# Patient Record
Sex: MALE | Race: WHITE | NOT HISPANIC OR LATINO | Employment: OTHER | URBAN - METROPOLITAN AREA
[De-identification: names, ages, dates, MRNs, and addresses within clinical notes are randomized per-mention and may not be internally consistent; named-entity substitution may affect disease eponyms.]

---

## 2017-01-04 ENCOUNTER — TELEPHONE (OUTPATIENT)
Dept: PAIN MEDICINE | Facility: CLINIC | Age: 67
End: 2017-01-04

## 2017-01-04 RX ORDER — ETODOLAC 400 MG/1
400 TABLET, FILM COATED ORAL 2 TIMES DAILY
Qty: 60 TABLET | Refills: 2 | Status: SHIPPED | OUTPATIENT
Start: 2017-01-04 | End: 2017-07-07 | Stop reason: SDUPTHER

## 2017-01-04 NOTE — TELEPHONE ENCOUNTER
Pt stated he is not getting relief from procedure. He was offered a earlier appt. Will be seen on Friday.

## 2017-01-04 NOTE — TELEPHONE ENCOUNTER
----- Message from Alexa Miranda sent at 1/4/2017  1:22 PM CST -----  Contact: self  Patient [phone] received an injection in his back about two weeks ago and is calling to let Dr Pearce know how he is doing/please call

## 2017-01-06 ENCOUNTER — OFFICE VISIT (OUTPATIENT)
Dept: PAIN MEDICINE | Facility: CLINIC | Age: 67
End: 2017-01-06
Payer: COMMERCIAL

## 2017-01-06 VITALS
HEART RATE: 87 BPM | SYSTOLIC BLOOD PRESSURE: 123 MMHG | HEIGHT: 72 IN | BODY MASS INDEX: 28.04 KG/M2 | DIASTOLIC BLOOD PRESSURE: 88 MMHG | WEIGHT: 207 LBS

## 2017-01-06 DIAGNOSIS — M96.1 POSTLAMINECTOMY SYNDROME OF LUMBAR REGION: ICD-10-CM

## 2017-01-06 DIAGNOSIS — M46.1 SACROILIITIS: ICD-10-CM

## 2017-01-06 DIAGNOSIS — M47.812 SPONDYLOSIS OF CERVICAL REGION WITHOUT MYELOPATHY OR RADICULOPATHY: ICD-10-CM

## 2017-01-06 DIAGNOSIS — M54.16 BILATERAL LUMBAR RADICULOPATHY: Primary | ICD-10-CM

## 2017-01-06 PROCEDURE — 3079F DIAST BP 80-89 MM HG: CPT | Mod: S$GLB,,, | Performed by: PHYSICIAN ASSISTANT

## 2017-01-06 PROCEDURE — 1159F MED LIST DOCD IN RCRD: CPT | Mod: S$GLB,,, | Performed by: PHYSICIAN ASSISTANT

## 2017-01-06 PROCEDURE — 99999 PR PBB SHADOW E&M-EST. PATIENT-LVL III: CPT | Mod: PBBFAC,,, | Performed by: PHYSICIAN ASSISTANT

## 2017-01-06 PROCEDURE — 99213 OFFICE O/P EST LOW 20 MIN: CPT | Mod: S$GLB,,, | Performed by: PHYSICIAN ASSISTANT

## 2017-01-06 PROCEDURE — 3074F SYST BP LT 130 MM HG: CPT | Mod: S$GLB,,, | Performed by: PHYSICIAN ASSISTANT

## 2017-01-19 ENCOUNTER — TELEPHONE (OUTPATIENT)
Dept: SPINE | Facility: CLINIC | Age: 67
End: 2017-01-19

## 2017-01-19 DIAGNOSIS — M54.50 LUMBAR SPINE PAIN: Primary | ICD-10-CM

## 2017-02-07 ENCOUNTER — HOSPITAL ENCOUNTER (OUTPATIENT)
Dept: VASCULAR SURGERY | Facility: CLINIC | Age: 67
Discharge: HOME | End: 2017-02-07
Attending: ORTHOPAEDIC SURGERY
Payer: COMMERCIAL

## 2017-02-07 ENCOUNTER — INITIAL CONSULT (OUTPATIENT)
Dept: ORTHOPEDICS | Facility: CLINIC | Age: 67
End: 2017-02-07
Payer: COMMERCIAL

## 2017-02-07 ENCOUNTER — HOSPITAL ENCOUNTER (OUTPATIENT)
Dept: RADIOLOGY | Facility: HOSPITAL | Age: 67
Discharge: HOME | End: 2017-02-07
Attending: ORTHOPAEDIC SURGERY
Payer: COMMERCIAL

## 2017-02-07 ENCOUNTER — TELEPHONE (OUTPATIENT)
Dept: ORTHOPEDICS | Facility: CLINIC | Age: 67
End: 2017-02-07

## 2017-02-07 VITALS
HEIGHT: 72 IN | HEART RATE: 89 BPM | SYSTOLIC BLOOD PRESSURE: 132 MMHG | BODY MASS INDEX: 33.51 KG/M2 | RESPIRATION RATE: 18 BRPM | WEIGHT: 247.38 LBS | DIASTOLIC BLOOD PRESSURE: 90 MMHG

## 2017-02-07 DIAGNOSIS — M54.50 LUMBAR SPINE PAIN: ICD-10-CM

## 2017-02-07 DIAGNOSIS — I73.9 CLAUDICATION OF BOTH LOWER EXTREMITIES: ICD-10-CM

## 2017-02-07 DIAGNOSIS — Z98.890 HISTORY OF LUMBAR DISCECTOMY: Primary | ICD-10-CM

## 2017-02-07 PROCEDURE — 99999 PR PBB SHADOW E&M-EST. PATIENT-LVL IV: CPT | Mod: PBBFAC,,, | Performed by: ORTHOPAEDIC SURGERY

## 2017-02-07 PROCEDURE — 3075F SYST BP GE 130 - 139MM HG: CPT | Mod: S$GLB,,, | Performed by: ORTHOPAEDIC SURGERY

## 2017-02-07 PROCEDURE — 72120 X-RAY BEND ONLY L-S SPINE: CPT | Mod: 26,,, | Performed by: RADIOLOGY

## 2017-02-07 PROCEDURE — 93923 UPR/LXTR ART STDY 3+ LVLS: CPT | Mod: S$GLB,,, | Performed by: SURGERY

## 2017-02-07 PROCEDURE — 72100 X-RAY EXAM L-S SPINE 2/3 VWS: CPT | Mod: TC

## 2017-02-07 PROCEDURE — 72100 X-RAY EXAM L-S SPINE 2/3 VWS: CPT | Mod: 26,,, | Performed by: RADIOLOGY

## 2017-02-07 PROCEDURE — 3080F DIAST BP >= 90 MM HG: CPT | Mod: S$GLB,,, | Performed by: ORTHOPAEDIC SURGERY

## 2017-02-07 PROCEDURE — 99214 OFFICE O/P EST MOD 30 MIN: CPT | Mod: S$GLB,,, | Performed by: ORTHOPAEDIC SURGERY

## 2017-02-07 RX ORDER — MUPIROCIN 20 MG/G
OINTMENT TOPICAL
COMMUNITY
Start: 2017-01-09

## 2017-02-07 RX ORDER — TRAMADOL HYDROCHLORIDE 50 MG/1
TABLET ORAL
COMMUNITY
Start: 2017-01-09 | End: 2017-02-07

## 2017-02-07 RX ORDER — DIPHENHYDRAMINE HCL 25 MG
25 CAPSULE ORAL EVERY 6 HOURS PRN
COMMUNITY

## 2017-02-07 RX ORDER — IBUPROFEN 200 MG
200 TABLET ORAL EVERY 6 HOURS PRN
COMMUNITY

## 2017-02-07 NOTE — PROGRESS NOTES
DATE: 2/7/2017  PATIENT: Jorge Gipson    Attending Physician: Brandon Brasher M.D.    CHIEF COMPLAINT: back pain    HISTORY:  Jorge Gipson is a 67 y.o. 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 Alanis 10 years ago, here for initial evaluation of low back and right>left leg pain (Back - 8, Leg - 5). The pain has been present for 1 year without inciting event. The patient describes the pain as burning, achy pain that radiates to bilat feet.  The pain is worse with walking > 1hour and improved by rest and sitting down. There is occasional associated numbness and tingling. There is no subjective weakness. Prior treatments have included ESIs, NSAIDs but no surgeries or formal PT.    The Patient denies myelopathic symptoms such as handwriting changes or difficulty with buttons/coins/keys. Denies perineal paresthesias, bowel/bladder dysfunction.    PAST MEDICAL/SURGICAL HISTORY:  Past Medical History   Diagnosis Date    ALLERGIC RHINITIS     Allergy     Anticoagulant long-term use      ASA 81mg, Naprosyn    Anxiety     Arthritis     Asthma      3/3/2014-no problem in years    BPH (benign prostatic hypertrophy)     Chronic upper back pain     Diverticulum     DJD (degenerative joint disease), lumbar     Fever blister     GERD (gastroesophageal reflux disease)     HEARING LOSS      wears hearing aids    Hyperlipidemia     IBS (irritable bowel syndrome)     Otitis media     Rash     Shoulder pain, bilateral     Sleep apnea      no C-pap     Past Surgical History   Procedure Laterality Date    Tonsillectomy      Adenoidectomy      Tympanostomy tube placement      Mastoids       Billateral    Aural atresia repair      Lumbar spine surgery  2000     For stenosis    Rotator cuff repair       bilaterally, Right x1, left X2    Colonoscopy      Steroid injection to both shoulders      Epidural steroid injection       Pain management    Nerve block        Pain management    Radiofrequency ablation       of cervical nerve on left    Cervical nerve block       Paion Management       Current Medications:   Current Outpatient Prescriptions:     albuterol (PROAIR HFA) 90 mcg/actuation inhaler, Inhale 2 puffs into the lungs every 4 (four) hours as needed for Wheezing., Disp: 18 g, Rfl: 6    ciprofloxacin HCl (CILOXAN) 0.3 % ophthalmic solution, 5 drops to left EAR twice daily X 10 days, Disp: 10 mL, Rfl: 0    dicyclomine (BENTYL) 10 MG capsule, TAKE ONE CAPSULE BY MOUTH AT BEDTIME AS NEEDED, Disp: 90 capsule, Rfl: 0    diphenhydrAMINE (BENADRYL) 25 mg capsule, Take 25 mg by mouth every 6 (six) hours as needed for Itching., Disp: , Rfl:     etodolac (LODINE) 400 MG tablet, Take 1 tablet (400 mg total) by mouth 2 (two) times daily., Disp: 60 tablet, Rfl: 2    fluoxetine (PROZAC) 20 MG capsule, Take 1 capsule (20 mg total) by mouth once daily., Disp: 90 capsule, Rfl: 3    fluticasone (FLONASE) 50 mcg/actuation nasal spray, 2 sprays by Each Nare route once daily. At first sign of sore throat, increase to twice daily for a few days. Once improved, return to maintenance dose of once daily, Disp: 48 g, Rfl: 3    gabapentin (NEURONTIN) 100 MG capsule, Take 2 capsules (200 mg total) by mouth 3 (three) times daily., Disp: 180 capsule, Rfl: 2    hydrocortisone (ANUSOL-HC) 25 mg suppository, Place 1 suppository (25 mg total) rectally nightly. (Patient taking differently: Place 25 mg rectally 2 (two) times daily as needed. ), Disp: 12 suppository, Rfl: 10    hydrocortisone 2.5 % cream, Thin layer to AA face BID for 2 weeks or less as needed for flare, Disp: 28 g, Rfl: 2    ibuprofen (ADVIL,MOTRIN) 200 MG tablet, Take 200 mg by mouth every 6 (six) hours as needed for Pain., Disp: , Rfl:     ketoconazole (NIZORAL) 2 % shampoo, Wash hair with medicated shampoo at least 2x/week - let sit on scalp at least 5 minutes prior to rinsing, Disp: 120 mL, Rfl: 5    loratadine  (CLARITIN) 10 mg tablet, Take 10 mg by mouth once daily., Disp: , Rfl:     MULTIVITAMIN W-MINERALS/LUTEIN (MULTIVITAMIN-MINERALS-LUTEIN ORAL), Take 1 tablet by mouth once daily., Disp: , Rfl:     mupirocin (BACTROBAN) 2 % ointment, , Disp: , Rfl:     omeprazole (PRILOSEC) 40 MG capsule, Take 1 capsule (40 mg total) by mouth once daily., Disp: 90 capsule, Rfl: 3    rosuvastatin (CRESTOR) 10 MG tablet, Take 1 tablet (10 mg total) by mouth once daily., Disp: 90 tablet, Rfl: 3    tizanidine (ZANAFLEX) 4 MG tablet, TAKE ONE TABLET BY MOUTH ONCE DAILY AFTER  DINNER (Patient taking differently: TAKE ONE TABLET BY MOUTH ONCE DAILY AFTER  DINNER/ three times daily as needed), Disp: 30 tablet, Rfl: 0    VIT A/VIT C/VIT E/ZINC/COPPER (PRESERVISION AREDS ORAL), Take 1 tablet by mouth once daily. , Disp: , Rfl:     Social History:   Social History     Social History    Marital status:      Spouse name: N/A    Number of children: N/A    Years of education: N/A     Occupational History    [employer]      Social History Main Topics    Smoking status: Never Smoker    Smokeless tobacco: Former User     Types: Snuff, Chew     Quit date: 3/3/2006      Comment: Chewed tobacco for 30 years    Alcohol use 1.8 oz/week     3 Shots of liquor per week      Comment: socially     Drug use: No    Sexual activity: No     Other Topics Concern    Not on file     Social History Narrative       REVIEW OF SYSTEMS:  Constitution: Negative. Negative for chills, fever and night sweats.   Cardiovascular: Negative for chest pain and syncope.   Respiratory: Negative for cough and shortness of breath.   Gastrointestinal: See HPI. Negative for nausea/vomiting. Negative for abdominal pain.  Genitourinary: See HPI. Negative for discoloration or dysuria.  Hematologic/Lymphatic: neg for bleeding/clotting disorders.   Musculoskeletal: Negative for falls and muscle weakness.   Neurological: See HPI. neg history of seizures. neg history of  cranial surgery or shunts.  Neurological: See HPI. No seizures.   Endocrine: Negative for polydipsia, polyphagia and polyuria.   Allergic/Immunologic: Negative for hives and persistent infections.     EXAM:  Visit Vitals    BP (!) 132/90 (BP Location: Right arm, Patient Position: Sitting, BP Method: Automatic)    Pulse 89    Resp 18    Ht 6' (1.829 m)    Wt 112.2 kg (247 lb 5.7 oz)    BMI 33.55 kg/m2       PHYSICAL EXAMINATION:    General: The patient is a very pleasant 67 y.o. male in no apparent distress, the patient is orientatied to person, place and time.  Psych: Normal mood and affect  HEENT: Vision grossly intact, hearing intact to the spoken word.  Lungs: Respirations unlabored.  Gait: antalgic gait favoring right lower extremity.  Skin: Dorsal lumbar skin negative for rashes, lesions, hairy patches. Well healed midline lumbar incision. There is + lumbar tenderness to palpation.  Left paraspinal tenderness  Range of motion: Lumbar range of motion is acceptable.  Spinal Balance: Global saggital and coronal spinal balance acceptable, no significant for scoliosis and kyphosis.  Musculoskeletal: No pain with the range of motion of the bilateral hips. No trochanteric tenderness to palpation.  Vascular: Bilateral lower extremities warm and well perfused, Dorsalis pedis pulses 2+ bilaterally.  Neurological: Normal strength and tone in all major motor groups in the bilateral lower extremities. Normal sensation to light touch in the L2-S1 dermatomes bilaterally.  Deep tendon reflexes symmetric decreased in the bilateral lower extremities.  Negative Babinski bilaterally. Straight leg raise positive bilaterally.    IMAGING:      Today I personally reviewed AP, Lat and Flex/Ex  upright L-spine that demonstrate no spondylolisthesis.  Diffuse lumbar spondylosis     ASSESSMENT/PLAN:    Claudication - ABIs.  PT for back pain    Jorge was seen today for back pain.    Diagnoses and all orders for this visit:    History  of lumbar discectomy  -     Ambulatory Referral to Physical/Occupational Therapy    Claudication of both lower extremities  -     US Ankle Brachial Indices Ext LTD WO Str; Future  -     Vascular Lab ([facility]) Ankle Brachial Indices Resting; Future

## 2017-02-07 NOTE — MR AVS SNAPSHOT
[facility] [place]  [address]  [facility] [address]  Phone: [phone]                  Jorge Gipson   2017 10:00 AM   Initial consult    Description:  Male : 1950   Provider:  Brandon Brasher MD   Department:  [facility] [place]           Reason for Visit     Back Pain           Diagnoses this Visit        Comments    History of lumbar discectomy    -  Primary     Claudication of both lower extremities                To Do List           Future Appointments        Provider Department Dept Phone    2017 3:30 PM VASCULAR, LAB [facility] V[facility] [phone]    2017 1:00 PM Anselmo Devine MD [facility] [phone]      Goals (5 Years of Data)     None      [facility]     [facility] On Call Nurse Care Line -  Assistance  Registered nurses in the [facility] On Call Center provide clinical advisement, health education, appointment booking, and other advisory services.  Call for this free service at [phone].             Medications           Message regarding Medications     Verify the changes and/or additions to your medication regime listed below are the same as discussed with your clinician today.  If any of these changes or additions are incorrect, please notify your healthcare provider.        STOP taking these medications     aspirin 325 MG tablet Take 325 mg by mouth once daily.    methylPREDNISolone (MEDROL DOSEPACK) 4 mg tablet     predniSONE (DELTASONE) 5 MG tablet Take 1 tablet by mouth once daily.    tramadol (ULTRAM) 50 mg tablet     azelastine (ASTELIN) 137 mcg (0.1 %) nasal spray USE TWO SPRAY EVERY DAY AS NEEDED NASAL ALLERGIES           Verify that the below list of medications is an accurate representation of the medications you are currently taking.  If none reported, the list may be blank. If incorrect, please contact your healthcare provider. Carry this list with you in case of emergency.           Current  Medications     albuterol (PROAIR HFA) 90 mcg/actuation inhaler Inhale 2 puffs into the lungs every 4 (four) hours as needed for Wheezing.    ciprofloxacin HCl (CILOXAN) 0.3 % ophthalmic solution 5 drops to left EAR twice daily X 10 days    dicyclomine (BENTYL) 10 MG capsule TAKE ONE CAPSULE BY MOUTH AT BEDTIME AS NEEDED    diphenhydrAMINE (BENADRYL) 25 mg capsule Take 25 mg by mouth every 6 (six) hours as needed for Itching.    etodolac (LODINE) 400 MG tablet Take 1 tablet (400 mg total) by mouth 2 (two) times daily.    fluoxetine (PROZAC) 20 MG capsule Take 1 capsule (20 mg total) by mouth once daily.    fluticasone (FLONASE) 50 mcg/actuation nasal spray 2 sprays by Each Nare route once daily. At first sign of sore throat, increase to twice daily for a few days. Once improved, return to maintenance dose of once daily    gabapentin (NEURONTIN) 100 MG capsule Take 2 capsules (200 mg total) by mouth 3 (three) times daily.    hydrocortisone (ANUSOL-HC) 25 mg suppository Place 1 suppository (25 mg total) rectally nightly.    hydrocortisone 2.5 % cream Thin layer to AA face BID for 2 weeks or less as needed for flare    ibuprofen (ADVIL,MOTRIN) 200 MG tablet Take 200 mg by mouth every 6 (six) hours as needed for Pain.    ketoconazole (NIZORAL) 2 % shampoo Wash hair with medicated shampoo at least 2x/week - let sit on scalp at least 5 minutes prior to rinsing    loratadine (CLARITIN) 10 mg tablet Take 10 mg by mouth once daily.    MULTIVITAMIN W-MINERALS/LUTEIN (MULTIVITAMIN-MINERALS-LUTEIN ORAL) Take 1 tablet by mouth once daily.    mupirocin (BACTROBAN) 2 % ointment     omeprazole (PRILOSEC) 40 MG capsule Take 1 capsule (40 mg total) by mouth once daily.    rosuvastatin (CRESTOR) 10 MG tablet Take 1 tablet (10 mg total) by mouth once daily.    tizanidine (ZANAFLEX) 4 MG tablet TAKE ONE TABLET BY MOUTH ONCE DAILY AFTER  DINNER    VIT A/VIT C/VIT E/ZINC/COPPER (PRESERVISION AREDS ORAL) Take 1 tablet by mouth once daily.             Clinical Reference Information           Your Vitals Were     BP Pulse Resp Height Weight BMI    132/90 (BP Location: Right arm, Patient Position: Sitting, BP Method: Automatic) 89 18 6' (1.829 m) 112.2 kg (247 lb 5.7 oz) 33.55 kg/m2      Blood Pressure          Most Recent Value    BP  (!)  132/90      Allergies as of 2/7/2017     Adhesive    Valdecoxib      Immunizations Administered on Date of Encounter - 2/7/2017     None      Orders Placed During Today's Visit      Normal Orders This Visit    Ambulatory Referral to Physical/Occupational Therapy     Future Labs/Procedures Expected by Expires    US Ankle Brachial Indices Ext LTD WO Str  2/7/2017 2/7/2018    Vascular Lab () Ankle Brachial Indices Resting  As directed 2/7/2018      Language Assistance Services     ATTENTION: Language assistance services are available, free of charge. Please call [phone].      ATENCIÓN: Si habla ana m, tiene a wong disposición servicios gratuitos de asistencia lingüística. Llame al [phone].     CHÚ Ý: N?u b?n nói Ti?ng Vi?t, có các d?ch v? h? tr? ngôn ng? mi?n phí dành cho b?n. G?i s? [phone].         [facility] complies with applicable Federal civil rights laws and does not discriminate on the basis of race, color, national origin, age, disability, or sex.

## 2017-02-08 NOTE — PROGRESS NOTES
Pre-Visit Chart Review  For Appointment Scheduled on 2/14/17.    Health Maintenance Due   Topic Date Due    Colonoscopy  12/06/2015    Pneumococcal (65+) (1 of 2 - PCV13) 01/29/2017    Abdominal Aortic Aneurysm Screening  01/29/2017

## 2017-02-14 ENCOUNTER — OFFICE VISIT (OUTPATIENT)
Dept: FAMILY MEDICINE | Facility: CLINIC | Age: 67
End: 2017-02-14
Payer: COMMERCIAL

## 2017-02-14 ENCOUNTER — DOCUMENTATION ONLY (OUTPATIENT)
Dept: FAMILY MEDICINE | Facility: CLINIC | Age: 67
End: 2017-02-14

## 2017-02-14 ENCOUNTER — LAB VISIT (OUTPATIENT)
Dept: LAB | Facility: HOSPITAL | Age: 67
End: 2017-02-14
Attending: FAMILY MEDICINE
Payer: COMMERCIAL

## 2017-02-14 VITALS
HEART RATE: 105 BPM | DIASTOLIC BLOOD PRESSURE: 82 MMHG | TEMPERATURE: 98 F | WEIGHT: 245.13 LBS | HEIGHT: 72 IN | BODY MASS INDEX: 33.2 KG/M2 | RESPIRATION RATE: 18 BRPM | SYSTOLIC BLOOD PRESSURE: 124 MMHG

## 2017-02-14 DIAGNOSIS — E66.9 OBESITY (BMI 30-39.9): Primary | ICD-10-CM

## 2017-02-14 DIAGNOSIS — Z23 IMMUNIZATION DUE: ICD-10-CM

## 2017-02-14 DIAGNOSIS — L98.9 SKIN LESION: ICD-10-CM

## 2017-02-14 DIAGNOSIS — F32.A DEPRESSION, UNSPECIFIED DEPRESSION TYPE: ICD-10-CM

## 2017-02-14 DIAGNOSIS — E66.9 OBESITY (BMI 30-39.9): ICD-10-CM

## 2017-02-14 PROCEDURE — 3074F SYST BP LT 130 MM HG: CPT | Mod: S$GLB,,, | Performed by: FAMILY MEDICINE

## 2017-02-14 PROCEDURE — 82306 VITAMIN D 25 HYDROXY: CPT

## 2017-02-14 PROCEDURE — 3079F DIAST BP 80-89 MM HG: CPT | Mod: S$GLB,,, | Performed by: FAMILY MEDICINE

## 2017-02-14 PROCEDURE — 90670 PCV13 VACCINE IM: CPT | Mod: S$GLB,,, | Performed by: FAMILY MEDICINE

## 2017-02-14 PROCEDURE — 90471 IMMUNIZATION ADMIN: CPT | Mod: S$GLB,,, | Performed by: FAMILY MEDICINE

## 2017-02-14 PROCEDURE — 99214 OFFICE O/P EST MOD 30 MIN: CPT | Mod: 25,S$GLB,, | Performed by: FAMILY MEDICINE

## 2017-02-14 PROCEDURE — 36415 COLL VENOUS BLD VENIPUNCTURE: CPT | Mod: PO

## 2017-02-14 PROCEDURE — 99999 PR PBB SHADOW E&M-EST. PATIENT-LVL III: CPT | Mod: PBBFAC,,, | Performed by: FAMILY MEDICINE

## 2017-02-14 RX ORDER — ESCITALOPRAM OXALATE 10 MG/1
10 TABLET ORAL DAILY
Qty: 30 TABLET | Refills: 1 | Status: SHIPPED | OUTPATIENT
Start: 2017-02-14 | End: 2017-03-27 | Stop reason: ALTCHOICE

## 2017-02-14 NOTE — PROGRESS NOTES
[facility]  Progress Note    Subjective:       Patient ID: Jorge Gipson is a 67 y.o. male.    Chief Complaint: I think I have depression  HPI67 y.o.male is here today for 3 month follow-up.  Patient feels as though he may have depression.  Patient has symptoms of feeling down and depressed, the patient has lost interest in pleasurable activities, patient has increased fatigue, and patient may sit in same position for extended periods of time.  Patient feels as though only time he enjoys is during his sleep.  Patient has a history of depression in the past.  Patient has been on medications for depression in past but does not recall name of medication.  Patient is recently retired.  Patient also has been experiencing chronic pain.  Patient be contribution to his symptoms.  No further complaints at today's visit.  Review of Systems   Constitutional: Negative for chills, fatigue and fever.   HENT: Negative for congestion, ear pain, postnasal drip, sinus pressure, sneezing and sore throat.    Eyes: Negative for pain.   Respiratory: Negative for cough, shortness of breath and wheezing.    Cardiovascular: Negative for chest pain, palpitations and leg swelling.   Gastrointestinal: Negative for abdominal distention, abdominal pain, constipation, diarrhea, nausea and vomiting.   Genitourinary: Negative for difficulty urinating.   Musculoskeletal: Negative for back pain and myalgias.   Skin: Negative for rash.   Neurological: Negative for dizziness, seizures, syncope, weakness and numbness.   Psychiatric/Behavioral: Negative for self-injury, sleep disturbance and suicidal ideas. The patient is nervous/anxious.        Objective:      Vitals:    02/14/17 1310 02/14/17 1311   BP: 109/78 124/82   BP Location: Right arm Left arm   Patient Position: Sitting Sitting   BP Method: Automatic Automatic   Pulse: 106 105   Resp: 18    Temp: 98.1 °F (36.7 °C)    TempSrc: Oral    Weight: 111.2 kg (245 lb 2.4 oz)    Height: 6'  (1.829 m)      Body mass index is 33.25 kg/(m^2).  Physical Exam   Constitutional: He is oriented to person, place, and time. He appears well-developed and well-nourished.   HENT:   Head: Normocephalic and atraumatic.   Eyes: Conjunctivae and EOM are normal. Pupils are equal, round, and reactive to light.   Neck: Normal range of motion. Neck supple. No JVD present.   Cardiovascular: Normal rate, regular rhythm, normal heart sounds and intact distal pulses.  Exam reveals no gallop and no friction rub.    No murmur heard.  Pulmonary/Chest: Effort normal and breath sounds normal. No respiratory distress. He has no wheezes.   Abdominal: Soft. Bowel sounds are normal. There is no tenderness.   Musculoskeletal: Normal range of motion.   Neurological: He is alert and oriented to person, place, and time. No cranial nerve deficit.   Skin: Skin is warm and dry.   Psychiatric: He has a normal mood and affect. His behavior is normal. Judgment and thought content normal.   Nursing note and vitals reviewed.      Assessment:       1. Obesity (BMI 30-39.9)    2. Immunization due    3. Depression, unspecified depression type    4. Skin lesion        Plan:       Obesity (BMI 30-39.9)  -     Vitamin D; Future; Expected date: 2/14/17    Immunization due  -     Pneumococcal Conjugate Vaccine (13 Valent) (IM)    Depression, unspecified depression type  -     escitalopram oxalate (LEXAPRO) 10 MG tablet; Take 1 tablet (10 mg total) by mouth once daily.  Dispense: 30 tablet; Refill: 1  - Fish oil-1-2 grams daily  - SOS help for emotions self help book  - PHQ-9 15    Skin lesion  -     Ambulatory referral to Dermatology      Return in about 4 weeks (around 3/14/2017).  Anselmo Devine MD  [facility]  2/14/2017 1:47 PM

## 2017-02-14 NOTE — MR AVS SNAPSHOT
[facility]  [address]  [place] [address]  Phone: [phone]  Fax: [phone]                  Jorge Gipson   2017 1:00 PM   Office Visit    Description:  Male : 1950   Provider:  Anselmo Devine MD   Department:  [facility]           Reason for Visit     Follow-up           Diagnoses this Visit        Comments    Obesity (BMI 30-39.9)    -  Primary     Immunization due         Depression, unspecified depression type         Skin lesion                To Do List           Future Appointments        Provider Department Dept Phone    2017 2:15 PM [facility] [phone]    2017 9:00 AM Silviano Magana PT [facility] [phone]    3/27/2017 2:40 PM Anselmo Devine MD [facility] [phone]      Goals (5 Years of Data)     None      Follow-Up and Disposition     Return in about 4 weeks (around 3/14/2017).       These Medications        Disp Refills Start End    escitalopram oxalate (LEXAPRO) 10 MG tablet 30 tablet 1 2017    Take 1 tablet (10 mg total) by mouth once daily. - Oral    Pharmacy: [facility] [address] [place] [address] Ph #: [phone]         [facility]     [facility] Nurse Care [facility] -  Assistance  Registered nurses in the [facility] Center provide clinical advisement, health education, appointment booking, and other advisory services.  Call for this free service at [phone].             Medications           Message regarding Medications     Verify the changes and/or additions to your medication regime listed below are the same as discussed with your clinician today.  If any of these changes or additions are incorrect, please notify your healthcare provider.        START taking these NEW medications        Refills    escitalopram oxalate (LEXAPRO) 10 MG tablet 1    Sig: Take 1 tablet (10 mg total) by  mouth once daily.    Class: Normal    Route: Oral           Verify that the below list of medications is an accurate representation of the medications you are currently taking.  If none reported, the list may be blank. If incorrect, please contact your healthcare provider. Carry this list with you in case of emergency.           Current Medications     ciprofloxacin HCl (CILOXAN) 0.3 % ophthalmic solution 5 drops to left EAR twice daily X 10 days    dicyclomine (BENTYL) 10 MG capsule TAKE ONE CAPSULE BY MOUTH AT BEDTIME AS NEEDED    diphenhydrAMINE (BENADRYL) 25 mg capsule Take 25 mg by mouth every 6 (six) hours as needed for Itching.    etodolac (LODINE) 400 MG tablet Take 1 tablet (400 mg total) by mouth 2 (two) times daily.    fluoxetine (PROZAC) 20 MG capsule Take 1 capsule (20 mg total) by mouth once daily.    fluticasone (FLONASE) 50 mcg/actuation nasal spray 2 sprays by Each Nare route once daily. At first sign of sore throat, increase to twice daily for a few days. Once improved, return to maintenance dose of once daily    gabapentin (NEURONTIN) 100 MG capsule Take 2 capsules (200 mg total) by mouth 3 (three) times daily.    hydrocortisone (ANUSOL-HC) 25 mg suppository Place 1 suppository (25 mg total) rectally nightly.    hydrocortisone 2.5 % cream Thin layer to AA face BID for 2 weeks or less as needed for flare    ibuprofen (ADVIL,MOTRIN) 200 MG tablet Take 200 mg by mouth every 6 (six) hours as needed for Pain.    ketoconazole (NIZORAL) 2 % shampoo Wash hair with medicated shampoo at least 2x/week - let sit on scalp at least 5 minutes prior to rinsing    loratadine (CLARITIN) 10 mg tablet Take 10 mg by mouth once daily.    MULTIVITAMIN W-MINERALS/LUTEIN (MULTIVITAMIN-MINERALS-LUTEIN ORAL) Take 1 tablet by mouth once daily.    mupirocin (BACTROBAN) 2 % ointment     omeprazole (PRILOSEC) 40 MG capsule Take 1 capsule (40 mg total) by mouth once daily.    rosuvastatin (CRESTOR) 10 MG tablet Take 1 tablet (10  mg total) by mouth once daily.    tizanidine (ZANAFLEX) 4 MG tablet TAKE ONE TABLET BY MOUTH ONCE DAILY AFTER  DINNER    VIT A/VIT C/VIT E/ZINC/COPPER (PRESERVISION AREDS ORAL) Take 1 tablet by mouth once daily.     albuterol (PROAIR HFA) 90 mcg/actuation inhaler Inhale 2 puffs into the lungs every 4 (four) hours as needed for Wheezing.    escitalopram oxalate (LEXAPRO) 10 MG tablet Take 1 tablet (10 mg total) by mouth once daily.           Clinical Reference Information           Your Vitals Were     BP Pulse Temp Resp Height Weight    124/82 (BP Location: Left arm, Patient Position: Sitting, BP Method: Automatic) 105 98.1 °F (36.7 °C) (Oral) 18 6' (1.829 m) 111.2 kg (245 lb 2.4 oz)    BMI                33.25 kg/m2          Blood Pressure          Most Recent Value    BP  124/82      Allergies as of 2/14/2017     Adhesive    Valdecoxib      Immunizations Administered on Date of Encounter - 2/14/2017     Name Date Dose VIS Date Route    Pneumococcal Conjugate - 13 Valent 2/14/2017 0.5 mL 11/5/2015 Intramuscular      Orders Placed During Today's Visit      Normal Orders This Visit    Ambulatory referral to Dermatology     Pneumococcal Conjugate Vaccine (13 Valent) (IM)     Future Labs/Procedures Expected by Expires    Vitamin D  2/14/2017 4/15/2018      Language Assistance Services     ATTENTION: Language assistance services are available, free of charge. Please call [phone].      ATENCIÓN: Si habla ana m, tiene a wong disposición servicios gratuitos de asistencia lingüística. Llame al [phone].     [facility] Ý: N?u b?n nói Ti?ng Vi?t, có các d?ch v? h? tr? ngôn ng? mi?n phí dành cho b?n. G?i s? [phone].         [facility] complies with applicable Federal civil rights laws and does not discriminate on the basis of race, color, national origin, age, disability, or sex.

## 2017-02-14 NOTE — PROGRESS NOTES
Patient identified using name and . VIS given to patient and procedure was explained. Patient verbalized understanding. Prevnar administered IM in the right deltoid using sterile technique. No residual bleeding noted. Patient tolerated procedure well.

## 2017-02-15 ENCOUNTER — TELEPHONE (OUTPATIENT)
Dept: FAMILY MEDICINE | Facility: CLINIC | Age: 67
End: 2017-02-15

## 2017-02-15 DIAGNOSIS — E55.9 VITAMIN D DEFICIENCY: Primary | ICD-10-CM

## 2017-02-15 LAB — 25(OH)D3+25(OH)D2 SERPL-MCNC: 22 NG/ML

## 2017-02-15 RX ORDER — ERGOCALCIFEROL 1.25 MG/1
50000 CAPSULE ORAL
Qty: 8 CAPSULE | Refills: 0 | Status: SHIPPED | OUTPATIENT
Start: 2017-02-15 | End: 2017-05-04

## 2017-02-15 RX ORDER — TIZANIDINE 4 MG/1
TABLET ORAL
Qty: 90 TABLET | Refills: 0 | Status: SHIPPED | OUTPATIENT
Start: 2017-02-15 | End: 2017-03-19 | Stop reason: SDUPTHER

## 2017-02-15 NOTE — TELEPHONE ENCOUNTER
----- Message from Anselmo Devine MD sent at 2/15/2017  9:56 AM CST -----  Please call and inform patient that he has vitamin D deficiency.  Appropriate vitamin supplementation has been: To pharmacy.  After completion patient will benefit from over-the-counter 1000 2000 units of vitamin D daily.

## 2017-02-20 ENCOUNTER — CLINICAL SUPPORT (OUTPATIENT)
Dept: REHABILITATION | Facility: HOSPITAL | Age: 67
End: 2017-02-20
Attending: FAMILY MEDICINE
Payer: COMMERCIAL

## 2017-02-20 DIAGNOSIS — M54.50 RIGHT-SIDED LOW BACK PAIN WITHOUT SCIATICA, UNSPECIFIED CHRONICITY: Primary | ICD-10-CM

## 2017-02-20 PROCEDURE — 97161 PT EVAL LOW COMPLEX 20 MIN: CPT | Mod: PN

## 2017-02-20 NOTE — PLAN OF CARE
TIME RECORD    Date: 02/20/2017    Start Time:  0900  Stop Time:  0945    PROCEDURES:    TIMED  Procedure Time Min.    Start:  Stop:     Start:  Stop:     Start:  Stop:     Start:  Stop:          UNTIMED  Procedure Time Min.   EVAL Start:  Stop:     Start:  Stop:      Total Timed Minutes:  0  Total Timed Units:  0  Total Untimed Units:  1  Charges Billed/# of units:  1    OUTPATIENT PHYSICAL THERAPY   PATIENT EVALUATION  Onset Date: Insidious.  Primary Diagnosis:   1. Right-sided low back pain without sciatica, unspecified chronicity       Treatment Diagnosis: Low back pain.  Past Medical History   Diagnosis Date    ALLERGIC RHINITIS     Allergy     Anticoagulant long-term use      ASA 81mg, Naprosyn    Anxiety     Arthritis     Asthma      3/3/2014-no problem in years    BPH (benign prostatic hypertrophy)     Chronic upper back pain     Diverticulum     DJD (degenerative joint disease), lumbar     Fever blister     GERD (gastroesophageal reflux disease)     HEARING LOSS      wears hearing aids    Hyperlipidemia     IBS (irritable bowel syndrome)     Otitis media     Rash     Shoulder pain, bilateral     Sleep apnea      no C-pap     Precautions: STD  Prior Therapy: Before LB Sx. In 2000  Medications: Jorge Gipson has a current medication list which includes the following prescription(s): albuterol, ciprofloxacin hcl, dicyclomine, diphenhydramine, ergocalciferol, escitalopram oxalate, etodolac, fluoxetine, fluticasone, gabapentin, hydrocortisone, hydrocortisone, ibuprofen, ketoconazole, loratadine, multivitamin with minerals/lut, mupirocin, omeprazole, rosuvastatin, tizanidine, tizanidine, and vit a/vit c/vit e/zinc/copper.  Nutrition:  Obese  History of Present Illness: Pt with chronic LBP S/P lumbar discectomy ~ 2000.   Prior Level of Function: Independent  Social History: Retired.  Place of Residence (Steps/Adaptations): In house with family.  Functional Deficits Leading to Referral/Nature  of Injury: Difficulties with ADLs, functional activities, homemaking.  Patient Therapy Goals: No pain.    Subjective     Jorge Gipson states Left side hurts more..    Pain:  Location: back   Description: Aching, Burning, Throbbing and Variable  Activities Which Increase Pain: Standing, Bending, Walking, Extension, Flexing and Lifting  Activities Which Decrease Pain: relaxation, pain medication, lying down, heating pad and rest  Pain Scale: 3/10 at best 3/10 now  10/10 at worst    Objective     Posture: Scoliosis,flexed hips,increased th/kyphosis, rt shoulder elevated.  Palpation: L2-S1 tender LT>RT.  Sensation: Intact.  DTRs:  Range of Motion/Strength:   Cervical/Thoracic/Lumbar AROM: Pain/Dysfunction with Movement:   Flexion  30    Extension  10    Right side bending  25    Left side bending  30    Right rotation   10    Left rotation  10       Flexibility: Both HS tight.  Gait: Without AD  Analysis: SBA - guarded, flexed hips.  Bed Mobility:Independent  Transfers: Independent  Special Tests: Slump test negative, bilaterally.SLR LT;70, RT;70 negative bilaterally. Tight HS bilat.  Other: MOD OSWESTRY 38/50 60-80% IMPAIRED.   Treatment: EVAL    Assessment       Initial Assessment (Pertinent finding, problem list and factors affecting outcome): Pt presents ROM and strength deficits of LS, poor posture, decreased functional status due to LBP and above listed deficits.  Rehab Potiential: good    Short Term Goals (3 Weeks): 1.Decrease pain x 1 grade. 2.Start HEP.  Long Term Goals (6 Weeks): 1.Pain 0-4/10. 2.Independent HEP. 3.ROM of LS WFL/WNL. 4.Normalize posture.5. Strength 5/5. 6.MOD OSWESTRY 19/50.7.Restore previous RACHELL.    Plan     Certification Period: 2/20/17 to 4/3/17  Recommended Treatment Plan: 2 times per week for 6 weeks: Electrical Stimulation PRN, Group Therapy, Manual Therapy, Moist Heat/ Ice, Patient Education, Therapeutic Activites and Therapeutic Exercise  Other Recommendations: Dry needling  LALITHA.      Therapist: Silviano Magana, PT    I CERTIFY THE NEED FOR THESE SERVICES FURNISHED UNDER THIS PLAN OF TREATMENT AND WHILE UNDER MY CARE    Physician's comments: ________________________________________________________________________________________________________________________________________________      Physician's Name: ___________________________________

## 2017-02-21 ENCOUNTER — CLINICAL SUPPORT (OUTPATIENT)
Dept: REHABILITATION | Facility: HOSPITAL | Age: 67
End: 2017-02-21
Attending: FAMILY MEDICINE
Payer: COMMERCIAL

## 2017-02-21 DIAGNOSIS — G89.29 CHRONIC RIGHT-SIDED LOW BACK PAIN WITHOUT SCIATICA: Primary | ICD-10-CM

## 2017-02-21 DIAGNOSIS — M54.50 CHRONIC RIGHT-SIDED LOW BACK PAIN WITHOUT SCIATICA: Primary | ICD-10-CM

## 2017-02-21 PROCEDURE — 97140 MANUAL THERAPY 1/> REGIONS: CPT | Mod: PN

## 2017-02-21 PROCEDURE — 97110 THERAPEUTIC EXERCISES: CPT | Mod: PN

## 2017-02-21 NOTE — PROGRESS NOTES
TIME RECORD    Date:  02/21/2017    Start Time:  0855  Stop Time:  0952    PROCEDURES:    TIMED  Procedure Time Min.   TE Start:0855  Stop:0920 25   DN  Start:0924  Stop:0952 28    Start:  Stop:     Start:  Stop:          UNTIMED  Procedure Time Min.    Start:  Stop:     Start:  Stop:      Total Timed Minutes:  53  Total Timed Units:  4  Total Untimed Units:  0  Charges Billed/# of units:  4      Progress/Current Status    Subjective:     Patient ID: Jroge Gipson is a 67 y.o. male.  Diagnosis:   1. Chronic right-sided low back pain without sciatica       Pain: 4 /10  Pt wants to try DN.    Objective:     TE for ROM, WARMUP, F/B MTT/DN to L3-S1 multifidus, HAs 14,2210,16,18.    Assessment:     Tolerated DN  Very well.    Patient Education/Response:     HEP  Back ext standing PRN.    Plans and Goals:     Cont per POC.

## 2017-02-21 NOTE — PROGRESS NOTES
02/21/17 0900   Ankle Exercises   Double Leg Calf Raises Reps/Sets/Weight 30   Standing Dorsiflexion Stretch(Gastroc) Reps/Sets/Hold Time 3X30   Lumbar Exercises   Hamsting Stretch Reps/Sets/Hold Time 3X30   Piriformis Stretch Reps/Sets/Hold Time 3X30   Additional Exercises   Additional Exercise BIKE 12'   Additional Exercise BACK EXT STAND 5

## 2017-02-27 ENCOUNTER — TELEPHONE (OUTPATIENT)
Dept: REHABILITATION | Facility: HOSPITAL | Age: 67
End: 2017-02-27

## 2017-02-27 ENCOUNTER — CLINICAL SUPPORT (OUTPATIENT)
Dept: REHABILITATION | Facility: HOSPITAL | Age: 67
End: 2017-02-27
Attending: ORTHOPAEDIC SURGERY
Payer: COMMERCIAL

## 2017-02-27 PROCEDURE — 97140 MANUAL THERAPY 1/> REGIONS: CPT | Mod: PN

## 2017-02-27 PROCEDURE — 97110 THERAPEUTIC EXERCISES: CPT | Mod: PN

## 2017-02-27 NOTE — PROGRESS NOTES
"Name: Jorge Gipson  [facility] Number: [id number]  Date of Treatment: 02/27/2017   Diagnosis: No diagnosis found.    Time in: 1100  Time Out: 1158  Total Treatment Time: 58  Group Time: 0      Subjective:    Jorge reports did "a lot of walking yesterday".  Patient reports their pain to be 3/10 on a 0-10 scale with 0 being no pain and 10 being the worst pain imaginable.    Objective    Jorge received therapeutic exercises to develop strength, endurance, flexibility and core stabilization for 50 minutes including:     Hamstring stretch 3x30" supine with strap  Gastroc stretch 3x30" 1/2 roll  LTR 2x10, 1x10 without ball 2nd to "groin" strain  SLR 3x10   PPT x30    Manual Therapy of Soft Tissue Mobilization to B hamstrings with Therapy roller x8'    Written Home Exercises Provided: Yes    Pt demo good understanding of the education provided. Jorge demonstrated good return demonstration of activities with cues for direction.     Assessment:     Pt will continue to benefit from skilled PT intervention. Medical Necessity is demonstrated by:  Pain limits function of effected part for some activities, Unable to participate fully in daily activities, Requires skilled supervision to complete and progress HEP and Weakness.    Patient is making good progress towards established goals.    Plan:    Continue with established Plan of Care towards PT goals.   "

## 2017-03-02 ENCOUNTER — CLINICAL SUPPORT (OUTPATIENT)
Dept: REHABILITATION | Facility: HOSPITAL | Age: 67
End: 2017-03-02
Attending: ORTHOPAEDIC SURGERY
Payer: COMMERCIAL

## 2017-03-02 DIAGNOSIS — M54.50 RIGHT-SIDED LOW BACK PAIN WITHOUT SCIATICA, UNSPECIFIED CHRONICITY: Primary | ICD-10-CM

## 2017-03-02 PROCEDURE — 97110 THERAPEUTIC EXERCISES: CPT | Mod: PN

## 2017-03-02 NOTE — PROGRESS NOTES
TIME RECORD    Date:  03/02/2017    Start Time:  0755  Stop Time:  0843    PROCEDURES:    TIMED  Procedure Time Min.   TE Start:0755  Stop:0843 48    Start:  Stop:     Start:  Stop:     Start:  Stop:          UNTIMED  Procedure Time Min.    Start:  Stop:     Start:  Stop:      Total Timed Minutes:  48  Total Timed Units:  3  Total Untimed Units:    Charges Billed/# of units:  3      Progress/Current Status    Subjective:     Patient ID: Jorge Gipson is a 67 y.o. male.  Diagnosis:   1. Right-sided low back pain without sciatica, unspecified chronicity       Pain: 2 /10  Sore after last session.    Objective:     TE for ROM, strength, stabilization.    Assessment:     Performed ex well.    Patient Education/Response:     Posture ed.    Plans and Goals:     Cont per POC.

## 2017-03-02 NOTE — PROGRESS NOTES
03/02/17 0800   Ankle Exercises   Double Leg Calf Raises Reps/Sets/Weight 30   Standing Dorsiflexion Stretch(Gastroc) Reps/Sets/Hold Time 3X30   Lumbar Exercises   Posterior Pelvic Tilts  Reps/Sets/Hold Time 30   Single Knee To Chest Stretch  Reps/Sets/Hold Time 3X30   Hamsting Stretch Reps/Sets/Hold Time 3X30   Piriformis Stretch Reps/Sets/Hold Time 3X30   Bridging Reps/Sets/Hold Time 3X10   Standing Back Bends Reps/Sets 5   Crunches  Reps/Sets 3X10   Lower Trunk Rotation Stretch Reps/Sets/Hold Time 30/30   Additional Exercises   Additional Exercise BIKE 12'

## 2017-03-07 ENCOUNTER — CLINICAL SUPPORT (OUTPATIENT)
Dept: REHABILITATION | Facility: HOSPITAL | Age: 67
End: 2017-03-07
Attending: ORTHOPAEDIC SURGERY
Payer: COMMERCIAL

## 2017-03-07 DIAGNOSIS — M54.50 LEFT-SIDED LOW BACK PAIN WITHOUT SCIATICA, UNSPECIFIED CHRONICITY: Primary | ICD-10-CM

## 2017-03-07 PROCEDURE — 97110 THERAPEUTIC EXERCISES: CPT | Mod: PN

## 2017-03-07 NOTE — PROGRESS NOTES
Name: Jorge Gipson  Clinic Number: [id number]  Date of Treatment: 03/07/2017   Diagnosis: Low Back Pain    Time in: 1500  Time Out: 1600  Total Treatment Time: 60      Subjective:    Jorge reports Back pain is on left side and is worse with standing or walking. Pain is better with sitting and positional lying in bed.  Patient reports their pain to be 5/10 on a 0-10 scale with 0 being no pain and 10 being the worst pain imaginable. After treatment today patient reports he is pain free.    Objective    Patient received individual therapy to increase strength, endurance, flexibility and core stabilization with activities as follows:     Jorge received therapeutic exercises to develop strength, endurance, flexibility and core stabilization for 60 minutes including:      Nu-Step L-3 x 10 min   HS with strap 3 x 10    Piriformis stretch 3 x 30 sec   Gastroc Stretch 3 x 30 sec   PPT 3 x 30   Bridging 3 x 30   SKTC 3 x 30   LTR over TB 3 x 30 ea side   [place] prone on elbows 3 minues   Prone press-ups x 30   Leg extensions in prone 3 x 15 ea   Core: Bird Dog x 30   Back extension in standing with one knee flexed for hip flexor stretch 3 x 30 sec ea   Cat Camel x 30       Written Home Exercises Provided: to be given to patient at next visit  Pt demo fair understanding of the education provided. Jorge demonstrated fair return demonstration of activities.     Assessment:       Pt will continue to benefit from skilled PT intervention. Medical Necessity is demonstrated by:  Pain limits function of effected part for some activities, Unable to participate fully in daily activities, Requires skilled supervision to complete and progress HEP and Weakness.    Patient is making fair progress towards established goals.      Plan:  Continue with established Plan of Care towards PT goals.

## 2017-03-09 ENCOUNTER — CLINICAL SUPPORT (OUTPATIENT)
Dept: REHABILITATION | Facility: HOSPITAL | Age: 67
End: 2017-03-09
Attending: ORTHOPAEDIC SURGERY
Payer: COMMERCIAL

## 2017-03-09 DIAGNOSIS — M54.50 CHRONIC RIGHT-SIDED LOW BACK PAIN WITHOUT SCIATICA: Primary | ICD-10-CM

## 2017-03-09 DIAGNOSIS — G89.29 CHRONIC RIGHT-SIDED LOW BACK PAIN WITHOUT SCIATICA: Primary | ICD-10-CM

## 2017-03-09 PROCEDURE — 97110 THERAPEUTIC EXERCISES: CPT | Mod: PN

## 2017-03-09 PROCEDURE — 97140 MANUAL THERAPY 1/> REGIONS: CPT | Mod: PN

## 2017-03-09 NOTE — PROGRESS NOTES
TIME RECORD    Date:  03/09/2017    Start Time:  0900  Stop Time:  0950    PROCEDURES:    TIMED  Procedure Time Min.   TE Start:0900  Stop:0916 16   MTT/DN Start:0920  Stop:0950 30    Start:  Stop:     Start:  Stop:          UNTIMED  Procedure Time Min.    Start:  Stop:     Start:  Stop:      Total Timed Minutes:  46  Total Timed Units:  3  Total Untimed Units:  0  Charges Billed/# of units:  3      Progress/Current Status    Subjective:     Patient ID: Jorge Gipson is a 67 y.o. male.  Diagnosis:   1. Chronic right-sided low back pain without sciatica       Pain: 3-4 /10  Better, pt wants to try DN.    Objective:     TE for ROM,strength f/b MTT/DN to L3-S1 multifidus, HAs 10,14,16,18,24.    Assessment:     Relief following DN.    Patient Education/Response:     Posture ed.    Plans and Goals:     Cont per POC.

## 2017-03-09 NOTE — PROGRESS NOTES
03/09/17 1600   Lumbar Exercises   Hamsting Stretch Reps/Sets/Hold Time 3X30   Piriformis Stretch Reps/Sets/Hold Time 3X30   Additional Exercises   Additional Exercise BIKE 12'

## 2017-03-21 ENCOUNTER — CLINICAL SUPPORT (OUTPATIENT)
Dept: REHABILITATION | Facility: HOSPITAL | Age: 67
End: 2017-03-21
Attending: ORTHOPAEDIC SURGERY
Payer: COMMERCIAL

## 2017-03-21 DIAGNOSIS — M54.50 LOW BACK PAIN WITHOUT SCIATICA, UNSPECIFIED BACK PAIN LATERALITY, UNSPECIFIED CHRONICITY: Primary | ICD-10-CM

## 2017-03-21 PROCEDURE — 97110 THERAPEUTIC EXERCISES: CPT | Mod: PN

## 2017-03-21 RX ORDER — GABAPENTIN 100 MG/1
CAPSULE ORAL
Qty: 180 CAPSULE | Refills: 0 | Status: SHIPPED | OUTPATIENT
Start: 2017-03-21 | End: 2017-04-25 | Stop reason: SDUPTHER

## 2017-03-21 RX ORDER — TIZANIDINE 4 MG/1
TABLET ORAL
Qty: 90 TABLET | Refills: 0 | Status: SHIPPED | OUTPATIENT
Start: 2017-03-21 | End: 2017-04-17 | Stop reason: SDUPTHER

## 2017-03-21 NOTE — PROGRESS NOTES
TIME RECORD    Date:  03/21/2017    Start Time:  1600  Stop Time:  1653    PROCEDURES:    TIMED  Procedure Time Min.   TE Start:1600  Stop:1653 53    Start:  Stop:     Start:  Stop:     Start:  Stop:          UNTIMED  Procedure Time Min.    Start:  Stop:     Start:  Stop:      Total Timed Minutes:  53  Total Timed Units:  4  Total Untimed Units:    Charges Billed/# of units:  4      Progress/Current Status    Subjective:     Patient ID: Jorge Gipson is a 67 y.o. male.  Diagnosis:   1. Low back pain without sciatica, unspecified back pain laterality, unspecified chronicity       Pain: 0 /10  No pain.    Objective:     TE for ROM,strength, stabilization.    Assessment:     Doing well.    Patient Education/Response:     Back ext standing HEP PRN.    Plans and Goals:     Cont per POC.

## 2017-03-21 NOTE — PROGRESS NOTES
03/21/17 1600   Ankle Exercises   Double Leg Calf Raises Reps/Sets/Weight 30   Standing Dorsiflexion Stretch(Gastroc) Reps/Sets/Hold Time 3X30   Knee Exercises   SLR Flexion Reps/Sets/Weight 30/30   Lumbar Exercises   Posterior Pelvic Tilts  Reps/Sets/Hold Time 30   Single Knee To Chest Stretch  Reps/Sets/Hold Time 3X30   Hamsting Stretch Reps/Sets/Hold Time 3X30   Piriformis Stretch Reps/Sets/Hold Time [id number]   Bridging Reps/Sets/Hold Time 3X10   Standing Back Bends Reps/Sets 5   Crunches  Reps/Sets 3X10   Lower Trunk Rotation Stretch Reps/Sets/Hold Time 30/30   Double Leg Stretch Reps/Sets/Hold Time 30   SB Prone Trunk Extension Reps/Sets/Hold Time 3X10   Additional Exercises   Additional Exercise NUSTEP 12' L4

## 2017-03-22 ENCOUNTER — DOCUMENTATION ONLY (OUTPATIENT)
Dept: FAMILY MEDICINE | Facility: CLINIC | Age: 67
End: 2017-03-22

## 2017-03-22 NOTE — PROGRESS NOTES
Pre-Visit Chart Review  For Appointment Scheduled on 3/27/17.    Health Maintenance Due   Topic Date Due    Colonoscopy  12/06/2015    Abdominal Aortic Aneurysm Screening  01/29/2017

## 2017-03-23 ENCOUNTER — CLINICAL SUPPORT (OUTPATIENT)
Dept: REHABILITATION | Facility: HOSPITAL | Age: 67
End: 2017-03-23
Attending: ORTHOPAEDIC SURGERY
Payer: COMMERCIAL

## 2017-03-23 DIAGNOSIS — M54.50 CHRONIC RIGHT-SIDED LOW BACK PAIN WITHOUT SCIATICA: Primary | ICD-10-CM

## 2017-03-23 DIAGNOSIS — G89.29 CHRONIC RIGHT-SIDED LOW BACK PAIN WITHOUT SCIATICA: Primary | ICD-10-CM

## 2017-03-23 PROCEDURE — 97110 THERAPEUTIC EXERCISES: CPT | Mod: PN

## 2017-03-23 NOTE — PROGRESS NOTES
"Name: Jorge Gipson  [facility] Number: [id number]  Date of Treatment: 03/23/2017   Diagnosis:   Encounter Diagnosis   Name Primary?    Chronic right-sided low back pain without sciatica Yes       Time in: 1005  Time Out: 1100  Total Treatment Time: 55  Group Time: 0      Subjective:    Jorge reports improvement of symptoms and continued ability to complete exercises without soreness.  Patient reports their pain to be 0/10 on a 0-10 scale with 0 being no pain and 10 being the worst pain imaginable.    Objective    Jorge received therapeutic exercises to develop strength, endurance, flexibility and core stabilization for 55 minutes including:     Bike 10'  Hamstring stretch 3x30" B in sit  Piriformis stretch 3x30" B in sit  IT band stretch 3x30" B side lying  Calf stretch 3x30"  B 1/2 roll U  PPT 3x30" with cues for direction  Bridges 3x10  LTR with Green Tball 3x10  SLR 3x10    Written Home Exercises Provided: continued HEP    Pt demo good understanding of the education provided. Jorge demonstrated good return demonstration of activities with cues for decreased reps to ensure proper form.     Assessment:     Pt will continue to benefit from skilled PT intervention. Medical Necessity is demonstrated by:  Pain limits function of effected part for some activities, Unable to participate fully in daily activities, Requires skilled supervision to complete and progress HEP and Weakness.    Patient is making good progress towards established goals.    Plan:    Continue with established Plan of Care towards PT goals.   "

## 2017-03-27 ENCOUNTER — OFFICE VISIT (OUTPATIENT)
Dept: FAMILY MEDICINE | Facility: CLINIC | Age: 67
End: 2017-03-27
Payer: COMMERCIAL

## 2017-03-27 VITALS
BODY MASS INDEX: 34.43 KG/M2 | HEART RATE: 104 BPM | TEMPERATURE: 98 F | RESPIRATION RATE: 18 BRPM | DIASTOLIC BLOOD PRESSURE: 80 MMHG | WEIGHT: 254.19 LBS | HEIGHT: 72 IN | SYSTOLIC BLOOD PRESSURE: 130 MMHG

## 2017-03-27 DIAGNOSIS — F32.A DEPRESSION, UNSPECIFIED DEPRESSION TYPE: ICD-10-CM

## 2017-03-27 PROCEDURE — 99999 PR PBB SHADOW E&M-EST. PATIENT-LVL III: CPT | Mod: PBBFAC,,, | Performed by: FAMILY MEDICINE

## 2017-03-27 PROCEDURE — 3075F SYST BP GE 130 - 139MM HG: CPT | Mod: S$GLB,,, | Performed by: FAMILY MEDICINE

## 2017-03-27 PROCEDURE — 3079F DIAST BP 80-89 MM HG: CPT | Mod: S$GLB,,, | Performed by: FAMILY MEDICINE

## 2017-03-27 PROCEDURE — 99213 OFFICE O/P EST LOW 20 MIN: CPT | Mod: S$GLB,,, | Performed by: FAMILY MEDICINE

## 2017-03-27 PROCEDURE — 1160F RVW MEDS BY RX/DR IN RCRD: CPT | Mod: S$GLB,,, | Performed by: FAMILY MEDICINE

## 2017-03-27 RX ORDER — ESCITALOPRAM OXALATE 20 MG/1
20 TABLET ORAL DAILY
Qty: 30 TABLET | Refills: 6 | Status: SHIPPED | OUTPATIENT
Start: 2017-03-27 | End: 2017-05-04 | Stop reason: SDUPTHER

## 2017-03-27 NOTE — MR AVS SNAPSHOT
[facility]  [address]  [address] [place] [address]  Phone: [phone]  Fax: [phone]                  Jorge Gipson   3/27/2017 2:40 PM   Office Visit    Description:  Male : 1950   Provider:  Anselmo Devine MD   Department:  [facility]           Reason for Visit     Follow-up           Diagnoses this Visit        Comments    Depression, unspecified depression type                To Do List           Future Appointments        Provider Department Dept Phone    3/27/2017 2:40 PM Anselmo Devine MD [facility] [phone]    3/28/2017 10:00 AM Silviano Magana, PT [facility] [phone]    3/30/2017 10:00 AM Silviano Magana, PT [facility] [phone]    2017 12:40 PM Anselmo Devine MD [facility] [phone]      Goals (5 Years of Data)     None      Follow-Up and Disposition     Return in about 4 weeks (around 2017).       These Medications        Disp Refills Start End    escitalopram oxalate (LEXAPRO) 20 MG tablet 30 tablet 6 3/27/2017 3/27/2018    Take 1 tablet (20 mg total) by mouth once daily. - Oral    Pharmacy: [facility] [address] [place] [address] Ph #: [phone]         [facility] On Call     [facility] Line -  Assistance  Registered nurses in the [facility] provide clinical advisement, health education, appointment booking, and other advisory services.  Call for this free service at [phone].             Medications           Message regarding Medications     Verify the changes and/or additions to your medication regime listed below are the same as discussed with your clinician today.  If any of these changes or additions are incorrect, please notify your healthcare provider.        START taking these NEW medications        Refills    escitalopram oxalate (LEXAPRO) 20 MG tablet 6    Sig: Take 1 tablet (20 mg  total) by mouth once daily.    Class: Normal    Route: Oral      STOP taking these medications     fluoxetine (PROZAC) 20 MG capsule Take 1 capsule (20 mg total) by mouth once daily.           Verify that the below list of medications is an accurate representation of the medications you are currently taking.  If none reported, the list may be blank. If incorrect, please contact your healthcare provider. Carry this list with you in case of emergency.           Current Medications     ciprofloxacin HCl (CILOXAN) 0.3 % ophthalmic solution 5 drops to left EAR twice daily X 10 days    dicyclomine (BENTYL) 10 MG capsule TAKE ONE CAPSULE BY MOUTH AT BEDTIME AS NEEDED    diphenhydrAMINE (BENADRYL) 25 mg capsule Take 25 mg by mouth every 6 (six) hours as needed for Itching.    ergocalciferol (ERGOCALCIFEROL) 50,000 unit Cap Take 1 capsule (50,000 Units total) by mouth every 7 days.    etodolac (LODINE) 400 MG tablet Take 1 tablet (400 mg total) by mouth 2 (two) times daily.    fluticasone (FLONASE) 50 mcg/actuation nasal spray 2 sprays by Each Nare route once daily. At first sign of sore throat, increase to twice daily for a few days. Once improved, return to maintenance dose of once daily    gabapentin (NEURONTIN) 100 MG capsule TAKE TWO CAPSULES BY MOUTH THREE TIMES DAILY    hydrocortisone (ANUSOL-HC) 25 mg suppository Place 1 suppository (25 mg total) rectally nightly.    hydrocortisone 2.5 % cream Thin layer to AA face BID for 2 weeks or less as needed for flare    ibuprofen (ADVIL,MOTRIN) 200 MG tablet Take 200 mg by mouth every 6 (six) hours as needed for Pain.    ketoconazole (NIZORAL) 2 % shampoo Wash hair with medicated shampoo at least 2x/week - let sit on scalp at least 5 minutes prior to rinsing    loratadine (CLARITIN) 10 mg tablet Take 10 mg by mouth once daily.    MULTIVITAMIN W-MINERALS/LUTEIN (MULTIVITAMIN-MINERALS-LUTEIN ORAL) Take 1 tablet by mouth once daily.    mupirocin (BACTROBAN) 2 % ointment      omeprazole (PRILOSEC) 40 MG capsule Take 1 capsule (40 mg total) by mouth once daily.    rosuvastatin (CRESTOR) 10 MG tablet Take 1 tablet (10 mg total) by mouth once daily.    tizanidine (ZANAFLEX) 4 MG tablet TAKE ONE TABLET BY MOUTH ONCE DAILY AFTER  DINNER    tizanidine (ZANAFLEX) 4 MG tablet TAKE ONE TABLET BY MOUTH THREE TIMES DAILY AS NEEDED    VIT A/VIT C/VIT E/ZINC/COPPER (PRESERVISION AREDS ORAL) Take 1 tablet by mouth once daily.     albuterol (PROAIR HFA) 90 mcg/actuation inhaler Inhale 2 puffs into the lungs every 4 (four) hours as needed for Wheezing.    escitalopram oxalate (LEXAPRO) 20 MG tablet Take 1 tablet (20 mg total) by mouth once daily.           Clinical Reference Information           Your Vitals Were     BP Pulse Temp Resp Height Weight    130/80 (BP Location: Right arm, Patient Position: Sitting, BP Method: Manual) 104 98.4 °F (36.9 °C) (Oral) 18 6' (1.829 m) 115.3 kg (254 lb 3.1 oz)    BMI                34.47 kg/m2          Blood Pressure          Most Recent Value    BP  130/80      Allergies as of 3/27/2017     Adhesive    Valdecoxib      Immunizations Administered on Date of Encounter - 3/27/2017     None      [facility] Sign-Up     Activating your [facility] account is as easy as 1-2-3!     1) Visit [email or web address], select Sign Up Now, enter this activation code and your date of birth, then select Next.  [email or web address]  Expires: 5/11/2017  1:54 PM      2) Create a username and password to use when you visit [facility] in the future and select a security question in case you lose your password and select Next.    3) Enter your e-mail address and click Sign Up!    Additional Information  If you have questions, please e-mail [email or web address] or call [phone] to talk to our [facility] staff. Remember, [facility] is NOT to be used for urgent needs. For medical emergencies, dial 911.         Language Assistance Services     ATTENTION: Language assistance services are available, free  of charge. Please call [phone].      ATENCIÓN: Si habla español, tiene a wong disposición servicios gratuitos de asistencia lingüística. Llame al [phone].     CHÚ Ý: N?u b?n nói Ti?ng Vi?t, có các d?ch v? h? tr? ngôn ng? mi?n phí dành cho b?n. G?i s? [phone].         [facility] complies with applicable Federal civil rights laws and does not discriminate on the basis of race, color, national origin, age, disability, or sex.

## 2017-03-27 NOTE — PROGRESS NOTES
[facility]  Progress Note    Subjective:       Patient ID: Jorge Gipson is a 67 y.o. male.    Chief Complaint: depression follow up     HPI67 y.o.male is here today for a one-month follow-up.  Patient was diagnosed with major depression at last appointment.  Patient was started on Lexapro 10 mg.  Since that time all of patient's symptoms have resolved.  Patient feels much better and feels like he is back at his baseline.  Patient has been on Prozac 20 mg for pain management for many years.  Plan to titrate Lexapro up to 20 mg and discontinue Prozac.  No further complaints at today's visit.  Review of Systems   Constitutional: Negative for chills, fatigue and fever.   HENT: Negative for congestion, ear pain, postnasal drip, sinus pressure, sneezing and sore throat.    Eyes: Negative for pain.   Respiratory: Negative for cough, shortness of breath and wheezing.    Cardiovascular: Negative for chest pain, palpitations and leg swelling.   Gastrointestinal: Negative for abdominal distention, abdominal pain, constipation, diarrhea, nausea and vomiting.   Genitourinary: Negative for difficulty urinating.   Musculoskeletal: Negative for back pain and myalgias.   Skin: Negative for rash.   Neurological: Negative for dizziness, seizures, syncope, weakness and numbness.   Psychiatric/Behavioral: Negative for sleep disturbance. The patient is not nervous/anxious.        Objective:      Vitals:    03/27/17 1412 03/27/17 1417   BP: (!) 140/83 130/80   BP Location: Right arm Right arm   Patient Position: Sitting Sitting   BP Method: Automatic Manual   Pulse: 104    Resp: 18    Temp: 98.4 °F (36.9 °C)    TempSrc: Oral    Weight: 115.3 kg (254 lb 3.1 oz)    Height: 6' (1.829 m)      Body mass index is 34.47 kg/(m^2).  Physical Exam   Constitutional: He is oriented to person, place, and time. He appears well-developed and well-nourished. No distress.   HENT:   Head: Normocephalic and atraumatic.   Cardiovascular: Normal  rate, regular rhythm, normal heart sounds and intact distal pulses.  Exam reveals no gallop and no friction rub.    No murmur heard.  Pulmonary/Chest: Effort normal and breath sounds normal. No respiratory distress. He has no rales.   Abdominal: Soft. He exhibits no distension and no mass. There is no rebound and no guarding. No hernia.   Musculoskeletal: Normal range of motion.   Neurological: He is alert and oriented to person, place, and time.   Skin: Skin is warm.   Psychiatric: He has a normal mood and affect. His behavior is normal. Judgment and thought content normal.   Vitals reviewed.      Assessment:       1. Depression, unspecified depression type        Plan:       Depression, unspecified depression type  -     escitalopram oxalate (LEXAPRO) 20 MG tablet; Take 1 tablet (20 mg total) by mouth once daily.  Dispense: 30 tablet; Refill: 6  - Discontinue prozac      Return in about 4 weeks (around 4/24/2017).  Anselmo Devine MD  [facility]  3/27/2017 2:29 PM

## 2017-03-28 ENCOUNTER — CLINICAL SUPPORT (OUTPATIENT)
Dept: REHABILITATION | Facility: HOSPITAL | Age: 67
End: 2017-03-28
Attending: ORTHOPAEDIC SURGERY
Payer: COMMERCIAL

## 2017-03-28 DIAGNOSIS — M54.50 RIGHT-SIDED LOW BACK PAIN WITHOUT SCIATICA, UNSPECIFIED CHRONICITY: Primary | ICD-10-CM

## 2017-03-28 PROCEDURE — 97110 THERAPEUTIC EXERCISES: CPT | Mod: PN

## 2017-03-28 NOTE — PROGRESS NOTES
TIME RECORD    Date:  03/28/2017    Start Time:  1000  Stop Time:  1047    PROCEDURES:    TIMED  Procedure Time Min.   TE Start: 1000  Stop:1047 47    Start:  Stop:     Start:  Stop:     Start:  Stop:          UNTIMED  Procedure Time Min.    Start:  Stop:     Start:  Stop:      Total Timed Minutes:  47  Total Timed Units:  3  Total Untimed Units:  0  Charges Billed/# of units:  3      Progress/Current Status    Subjective:     Patient ID: Jorge Gipson is a 67 y.o. male.  Diagnosis:   1. Right-sided low back pain without sciatica, unspecified chronicity       Pain: 0 /10  Better, no pain.    Objective:     TE for ROM,strength,     Assessment:     Good progress.    Patient Education/Response:     Posture ed.    Plans and Goals:     Cont per POC.

## 2017-03-29 NOTE — PROGRESS NOTES
03/28/17 1900   Ankle Exercises   Double Leg Calf Raises Reps/Sets/Weight 30   Standing Dorsiflexion Stretch(Gastroc) Reps/Sets/Hold Time 3X30   Knee Exercises   SLR Flexion Reps/Sets/Weight 30/30   Lumbar Exercises   Posterior Pelvic Tilts  Reps/Sets/Hold Time 30   Single Knee To Chest Stretch  Reps/Sets/Hold Time 3X30   Hamsting Stretch Reps/Sets/Hold Time 3X30   Piriformis Stretch Reps/Sets/Hold Time 3X30   Bridging Reps/Sets/Hold Time 30   Standing Back Bends Reps/Sets 5   Crunches  Reps/Sets 30   Lower Trunk Rotation Stretch Reps/Sets/Hold Time 30/30   Double Leg Stretch Reps/Sets/Hold Time 3X30   SB Prone Trunk Extension Reps/Sets/Hold Time 10   Additional Exercises   Additional Exercise BIKE 12'

## 2017-03-30 ENCOUNTER — CLINICAL SUPPORT (OUTPATIENT)
Dept: REHABILITATION | Facility: HOSPITAL | Age: 67
End: 2017-03-30
Attending: ORTHOPAEDIC SURGERY
Payer: COMMERCIAL

## 2017-03-30 DIAGNOSIS — M54.50 BILATERAL LOW BACK PAIN WITHOUT SCIATICA, UNSPECIFIED CHRONICITY: Primary | ICD-10-CM

## 2017-03-30 PROCEDURE — 97110 THERAPEUTIC EXERCISES: CPT | Mod: PN

## 2017-03-30 NOTE — PROGRESS NOTES
03/30/17 1100   Ankle Exercises   Double Leg Calf Raises Reps/Sets/Weight 30   Standing Dorsiflexion Stretch(Gastroc) Reps/Sets/Hold Time 3X30   Lumbar Exercises   Posterior Pelvic Tilts  Reps/Sets/Hold Time 30   Single Knee To Chest Stretch  Reps/Sets/Hold Time 3X30   Hamsting Stretch Reps/Sets/Hold Time 3X30   Piriformis Stretch Reps/Sets/Hold Time 3X30   Bridging Reps/Sets/Hold Time 30   Crunches  Reps/Sets 30   Lower Trunk Rotation Stretch Reps/Sets/Hold Time 30   Double Leg Stretch Reps/Sets/Hold Time 3X30   Additional Exercises   Additional Exercise BIKE 12.        03/30/17 1100   Ankle Exercises   Double Leg Calf Raises Reps/Sets/Weight 30   Standing Dorsiflexion Stretch(Gastroc) Reps/Sets/Hold Time 3X30   Lumbar Exercises   Posterior Pelvic Tilts  Reps/Sets/Hold Time 30   Single Knee To Chest Stretch  Reps/Sets/Hold Time 3X30   Hamsting Stretch Reps/Sets/Hold Time 3X30   Piriformis Stretch Reps/Sets/Hold Time 3X30   Bridging Reps/Sets/Hold Time 30   Crunches  Reps/Sets 30   Lower Trunk Rotation Stretch Reps/Sets/Hold Time 30   Double Leg Stretch Reps/Sets/Hold Time 3X30   Additional Exercises   Additional Exercise BIKE 12.

## 2017-03-30 NOTE — PROGRESS NOTES
TIME RECORD    Date:  03/30/2017    Start Time:  1000  Stop Time:  1045    PROCEDURES:    TIMED  Procedure Time Min.   TE Start:1000  Stop:1045 45    Start:  Stop:     Start:  Stop:     Start:  Stop:          UNTIMED  Procedure Time Min.    Start:  Stop:     Start:  Stop:      Total Timed Minutes:  45  Total Timed Units:  3  Total Untimed Units:  0  Charges Billed/# of units:  3      Progress/Current Status    Subjective:     Patient ID: Jorge Gipson is a 67 y.o. male.  Diagnosis:   1. Bilateral low back pain without sciatica, unspecified chronicity       Pain: 0 /10      Objective:     TE for ROM,strength, stabilization.    Assessment:     Doing well    Patient Education/Response:     RACHELL as tolerated.    Plans and Goals:     Con per POC.

## 2017-04-03 ENCOUNTER — CLINICAL SUPPORT (OUTPATIENT)
Dept: REHABILITATION | Facility: HOSPITAL | Age: 67
End: 2017-04-03
Attending: ORTHOPAEDIC SURGERY
Payer: COMMERCIAL

## 2017-04-03 DIAGNOSIS — G89.29 CHRONIC RIGHT-SIDED LOW BACK PAIN WITHOUT SCIATICA: ICD-10-CM

## 2017-04-03 DIAGNOSIS — M54.50 CHRONIC RIGHT-SIDED LOW BACK PAIN WITHOUT SCIATICA: ICD-10-CM

## 2017-04-03 PROCEDURE — 97110 THERAPEUTIC EXERCISES: CPT | Mod: PN

## 2017-04-03 NOTE — MR AVS SNAPSHOT
[facility] [address] [place] [address]  Phone: [phone]  Fax: [phone]                  Jorge Gipson   4/3/2017 12:00 PM   Clinical Support    Description:  Male : 1950   Provider:  Aranza Webb PTA   Department:  [facility]           Diagnoses this Visit        Comments    Chronic right-sided low back pain without sciatica                To Do List           Future Appointments        Provider Department Dept Phone    2017 8:00 AM Silviano Magana PT [facility] [phone]    2017 12:40 PM Anselmo Devine MD [facility] [phone]      Goals (5 Years of Data)     None      [facility] On [facility]     [facility] -  Assistance  Unless otherwise directed by your provider, please contact [facility], our nurse care line that is available for  assistance.     Registered nurses in the [facility] provide: appointment scheduling, clinical advisement, health education, and other advisory services.  Call: [phone] (toll free)               Medications           Message regarding Medications     Verify the changes and/or additions to your medication regime listed below are the same as discussed with your clinician today.  If any of these changes or additions are incorrect, please notify your healthcare provider.             Verify that the below list of medications is an accurate representation of the medications you are currently taking.  If none reported, the list may be blank. If incorrect, please contact your healthcare provider. Carry this list with you in case of emergency.           Current Medications     albuterol (PROAIR HFA) 90 mcg/actuation inhaler Inhale 2 puffs into the lungs every 4 (four) hours as needed for Wheezing.    ciprofloxacin HCl (CILOXAN) 0.3 % ophthalmic solution 5 drops to left EAR twice daily X 10 days    dicyclomine  (BENTYL) 10 MG capsule TAKE ONE CAPSULE BY MOUTH AT BEDTIME AS NEEDED    diphenhydrAMINE (BENADRYL) 25 mg capsule Take 25 mg by mouth every 6 (six) hours as needed for Itching.    ergocalciferol (ERGOCALCIFEROL) 50,000 unit Cap Take 1 capsule (50,000 Units total) by mouth every 7 days.    escitalopram oxalate (LEXAPRO) 20 MG tablet Take 1 tablet (20 mg total) by mouth once daily.    etodolac (LODINE) 400 MG tablet Take 1 tablet (400 mg total) by mouth 2 (two) times daily.    fluticasone (FLONASE) 50 mcg/actuation nasal spray 2 sprays by Each Nare route once daily. At first sign of sore throat, increase to twice daily for a few days. Once improved, return to maintenance dose of once daily    gabapentin (NEURONTIN) 100 MG capsule TAKE TWO CAPSULES BY MOUTH THREE TIMES DAILY    hydrocortisone (ANUSOL-HC) 25 mg suppository Place 1 suppository (25 mg total) rectally nightly.    hydrocortisone 2.5 % cream Thin layer to AA face BID for 2 weeks or less as needed for flare    ibuprofen (ADVIL,MOTRIN) 200 MG tablet Take 200 mg by mouth every 6 (six) hours as needed for Pain.    ketoconazole (NIZORAL) 2 % shampoo Wash hair with medicated shampoo at least 2x/week - let sit on scalp at least 5 minutes prior to rinsing    loratadine (CLARITIN) 10 mg tablet Take 10 mg by mouth once daily.    MULTIVITAMIN W-MINERALS/LUTEIN (MULTIVITAMIN-MINERALS-LUTEIN ORAL) Take 1 tablet by mouth once daily.    mupirocin (BACTROBAN) 2 % ointment     omeprazole (PRILOSEC) 40 MG capsule Take 1 capsule (40 mg total) by mouth once daily.    rosuvastatin (CRESTOR) 10 MG tablet Take 1 tablet (10 mg total) by mouth once daily.    tizanidine (ZANAFLEX) 4 MG tablet TAKE ONE TABLET BY MOUTH ONCE DAILY AFTER  DINNER    tizanidine (ZANAFLEX) 4 MG tablet TAKE ONE TABLET BY MOUTH THREE TIMES DAILY AS NEEDED    VIT A/VIT C/VIT E/ZINC/COPPER (PRESERVISION AREDS ORAL) Take 1 tablet by mouth once daily.            Clinical Reference Information           Allergies  as of 4/3/2017     Adhesive    Valdecoxib      Immunizations Administered on Date of Encounter - 4/3/2017     None      [facility] Sign-Up     Activating your [facility] account is as easy as 1-2-3!     1) Visit [email or web address], select Sign Up Now, enter this activation code and your date of birth, then select Next.  [email or web address]  Expires: 5/11/2017  1:54 PM      2) Create a username and password to use when you visit [facility] in the future and select a security question in case you lose your password and select Next.    3) Enter your e-mail address and click Sign Up!    Additional Information  If you have questions, please e-mail [email or web address] or call [phone] to talk to our [facility] staff. Remember, [facility] is NOT to be used for urgent needs. For medical emergencies, dial 911.         Instructions    Pelvic Tilt: Posterior - Legs Bent (Supine)        Tighten stomach and flatten back by rolling pelvis down. Hold __5__ seconds. Relax.  Repeat __10__ times per set. Do __3__ sets per session. Do _1-2___ sessions per day.     [email or web address]     Copyright © [employer]. All rights reserved.   Bridging        Slowly raise buttocks from floor, keeping stomach tight.  Repeat __10__ times per set. Do __3__ sets per session. Do __1-2__ sessions per day.     [email or web address]://TheDressSpot.com.Instacover/1096     Copyright © [employer]. All rights reserved.   Straight Leg Raise        Tighten stomach and slowly raise locked right leg __8__ inches from floor.  Repeat __10__ times per set. Do __3__ sets per session. Do _1-2___ sessions per day.     [email or web address]://[email or web address].[email or web address]/1102     Copyright © [employer]. All rights reserved.   Lumbar Rotation (Non-Weight Bearing)        Feet on floor, slowly rock knees from side to side in small, pain-free range of motion. Allow lower back to rotate slightly.  Repeat __10__ times per set. Do __3__ sets per session. Do _1-2___ sessions per day.     [email or web address]://[email or web address].[email or web address]/160     Copyright © I. All rights reserved.    Knee-to-Chest Stretch: Unilateral        With hand behind right knee, pull knee in to chest until a comfortable stretch is felt in lower back and buttocks. Keep back relaxed. Hold __5__ seconds.  Repeat _10___ times per set. Do __3__ sets per session. Do __1-2__ sessions per day.     [email or web address]://[email or web address].connex.io.us/126     Copyright © [employer]. All rights reserved.   Isometric Abdominal        Lying on back with knees bent, tighten stomach by pressing elbows down. Hold ____ seconds.  Repeat ____ times per set. Do ____ sets per session. Do ____ sessions per day.     [email or web address]://[email or web address].connex.[email or web address]."[email or web address]"/1086     Copyright © [employer]. All rights reserved.          Language Assistance Services     ATTENTION: Language assistance services are available, free of charge. Please call [phone].      ATENCIÓN: Si habla español, tiene a wong disposición servicios gratuitos de asistencia lingüística. Llame al [phone].     CHÚ Ý: N?u b?n nói Ti?ng Vi?t, có các d?ch v? h? tr? ngôn ng? mi?n phí dành cho b?n. G?i s? [phone].         [facility] complies with applicable Federal civil rights laws and does not discriminate on the basis of race, color, national origin, age, disability, or sex.

## 2017-04-03 NOTE — PROGRESS NOTES
Name: Jorge Gipson  Clinic Number: [id number]  Date of Treatment: 04/03/2017   Diagnosis:   Encounter Diagnosis   Name Primary?    Chronic right-sided low back pain without sciatica        Time in: 1203  Time Out: 1245  Total Treatment Time: 42    Subjective:    Jorge reports improvement of symptoms.  Patient denies pain at present but reports gets 4/10 R buttock discomfort with prolonged sitting and leaning to R in sitting. Reports continues HEP daily without problems.    Objective    Patient received individual therapy to increase strength, endurance, ROM, flexibility, posture and core stabilization with activities as follows:     Jorge received therapeutic exercises to develop strength, endurance, ROM, flexibility, posture and core stabilization for 42 minutes including:     NuStep L5 LE's only x 12'  Seated hamstring stretches 3/30s L/R  Standing gastroc stretches 3/30s L/R in // bars  Supine PPT 30  Supine TrA sets 30  Supine SLR L/R low ranges 10/3   Supine LTR with swiss ball 10/3  Supine bridges 10/3 B  Supine SKC 3/30s L/R  Supine hip aDD ballsqueezes 10/3 B  Supine hip aD with GTB 10/3 B    Continue HEP daily. Written and pictorial HEP of PPT, TrA, SLR, bridges, SKC, hamstring and gastroc stretches, and LTR reviewed and issued to pt. Pt instructed to perform HEP daily and stop if symptoms increase.       Pt demo good understanding of the education provided. Jorge demonstrated good return demonstration of activities.     Assessment:     Progressing well with ex's. Min discomfort R buttock after NuStep, pt attributed to prolonged sitting position, which resolved with ex's. Good recall of ex's.     Pt will continue to benefit from skilled PT intervention. Medical Necessity is demonstrated by:  Requires skilled supervision to complete and progress HEP and Weakness.    Patient is making good progress towards established goals.    Plan:    Continue with established Plan of Care towards PT goals.

## 2017-04-03 NOTE — PATIENT INSTRUCTIONS
Pelvic Tilt: Posterior - Legs Bent (Supine)        Tighten stomach and flatten back by rolling pelvis down. Hold __5__ seconds. Relax.  Repeat __10__ times per set. Do __3__ sets per session. Do _1-2___ sessions per day.     https://Genetix Fusion/202     Copyright © [employer]. All rights reserved.   Bridging        Slowly raise buttocks from floor, keeping stomach tight.  Repeat __10__ times per set. Do __3__ sets per session. Do __1-2__ sessions per day.     https://Genetix Fusion/1096     Copyright © [employer]. All rights reserved.   Straight Leg Raise        Tighten stomach and slowly raise locked right leg __8__ inches from floor.  Repeat __10__ times per set. Do __3__ sets per session. Do _1-2___ sessions per day.     https://Genetix Fusion/1102     Copyright © [employer]. All rights reserved.   Lumbar Rotation (Non-Weight Bearing)        Feet on floor, slowly rock knees from side to side in small, pain-free range of motion. Allow lower back to rotate slightly.  Repeat __10__ times per set. Do __3__ sets per session. Do _1-2___ sessions per day.     https://Genetix Fusion/160     Copyright © [employer]. All rights reserved.   Knee-to-Chest Stretch: Unilateral        With hand behind right knee, pull knee in to chest until a comfortable stretch is felt in lower back and buttocks. Keep back relaxed. Hold __5__ seconds.  Repeat _10___ times per set. Do __3__ sets per session. Do __1-2__ sessions per day.     https://Genetix Fusion/126     Copyright © [employer]. All rights reserved.   Isometric Abdominal        Lying on back with knees bent, tighten stomach by pressing elbows down. Hold ____ seconds.  Repeat ____ times per set. Do ____ sets per session. Do ____ sessions per day.     https://Genetix Fusion/1086     Copyright © [employer]. All rights reserved.

## 2017-04-05 ENCOUNTER — CLINICAL SUPPORT (OUTPATIENT)
Dept: REHABILITATION | Facility: HOSPITAL | Age: 67
End: 2017-04-05
Attending: ORTHOPAEDIC SURGERY
Payer: MEDICARE

## 2017-04-05 DIAGNOSIS — M54.50 CHRONIC BILATERAL LOW BACK PAIN WITHOUT SCIATICA: Primary | ICD-10-CM

## 2017-04-05 DIAGNOSIS — G89.29 CHRONIC BILATERAL LOW BACK PAIN WITHOUT SCIATICA: Primary | ICD-10-CM

## 2017-04-05 NOTE — PROGRESS NOTES
04/05/17 0800   Ankle Exercises   Standing Dorsiflexion Stretch(Gastroc) Reps/Sets/Hold Time 3X30   Lumbar Exercises   Posterior Pelvic Tilts  Reps/Sets/Hold Time 30   Hamsting Stretch Reps/Sets/Hold Time 3x30   Piriformis Stretch Reps/Sets/Hold Time 3x30   Lower Trunk Rotation Stretch Reps/Sets/Hold Time 30/30   Additional Exercises   Additional Exercise bike 15'   Additional Exercise HEP15'

## 2017-04-05 NOTE — PROGRESS NOTES
TIME RECORD    Date: 4/5/17    Start Time:  0800  Stop Time:  0850    PROCEDURES:    TIMED  Procedure Time Min.   HEP Start:  Stop:     Start:  Stop:     Start:  Stop:     Start:  Stop:          UNTIMED  Procedure Time Min.   REEVAL Start:  Stop:     Start:  Stop:      Total Timed Minutes:     Total Timed Units:    Total Untimed Units:  1  Charges Billed/# of units:  REHAB SERVICES OUTPATIENT DISCHARGE SUMMARY  Physical Therapy      Name:  Jorge Gipson  Date:  4/5/17  Date of Evaluation:  2/20/17  Physician:  SANTI  Total # Of Visits:  12  Cancelled:    No Shows:    Diagnosis:    1. Chronic bilateral low back pain without sciatica         Physical/Functional Status:  At time of discharge, patient was able to Pt reports no pain 0/10.  ROM of LS WFL/WNL.  Strength 5/5 in all planes.  MOD OSWESTRY 20/50.  The patient is to be discharged from our Therapy service for the following reason(s):  Patient has met all of his/her goals    Degree of Goal Achievement:  Patient has met all goals    Patient Education:  HEP issued.    Discharge Plan:  Home Program:  5-7 x wk.

## 2017-04-10 NOTE — TELEPHONE ENCOUNTER
----- Message from RT Jesús sent at 4/10/2017  9:52 AM CDT -----  Contact: pt    pt , requesting medication refill: dicyblomine 10 mg, 90 quantity, thanks.

## 2017-04-11 RX ORDER — DICYCLOMINE HYDROCHLORIDE 10 MG/1
10 CAPSULE ORAL NIGHTLY
Qty: 90 CAPSULE | Refills: 0 | Status: SHIPPED | OUTPATIENT
Start: 2017-04-11 | End: 2017-05-17 | Stop reason: SDUPTHER

## 2017-04-13 ENCOUNTER — OFFICE VISIT (OUTPATIENT)
Dept: ORTHOPEDICS | Facility: CLINIC | Age: 67
End: 2017-04-13
Payer: COMMERCIAL

## 2017-04-13 VITALS
DIASTOLIC BLOOD PRESSURE: 75 MMHG | HEIGHT: 72 IN | BODY MASS INDEX: 34.4 KG/M2 | HEART RATE: 91 BPM | WEIGHT: 254 LBS | SYSTOLIC BLOOD PRESSURE: 140 MMHG

## 2017-04-13 DIAGNOSIS — M25.511 RIGHT SHOULDER PAIN, UNSPECIFIED CHRONICITY: Primary | ICD-10-CM

## 2017-04-13 DIAGNOSIS — M75.101 TEAR OF RIGHT ROTATOR CUFF, UNSPECIFIED TEAR EXTENT: Primary | ICD-10-CM

## 2017-04-13 PROCEDURE — 99999 PR PBB SHADOW E&M-EST. PATIENT-LVL III: CPT | Mod: PBBFAC,,, | Performed by: ORTHOPAEDIC SURGERY

## 2017-04-13 PROCEDURE — 99213 OFFICE O/P EST LOW 20 MIN: CPT | Mod: S$GLB,,, | Performed by: ORTHOPAEDIC SURGERY

## 2017-04-13 PROCEDURE — 3078F DIAST BP <80 MM HG: CPT | Mod: S$GLB,,, | Performed by: ORTHOPAEDIC SURGERY

## 2017-04-13 PROCEDURE — 1160F RVW MEDS BY RX/DR IN RCRD: CPT | Mod: S$GLB,,, | Performed by: ORTHOPAEDIC SURGERY

## 2017-04-13 PROCEDURE — 3077F SYST BP >= 140 MM HG: CPT | Mod: S$GLB,,, | Performed by: ORTHOPAEDIC SURGERY

## 2017-04-13 NOTE — PROGRESS NOTES
Past Medical History:   Diagnosis Date    ALLERGIC RHINITIS     Allergy     Anticoagulant long-term use     ASA 81mg, Naprosyn    Anxiety     Arthritis     Asthma     3/3/2014-no problem in years    BPH (benign prostatic hypertrophy)     Chronic upper back pain     Diverticulum     DJD (degenerative joint disease), lumbar     Fever blister     GERD (gastroesophageal reflux disease)     HEARING LOSS     wears hearing aids    Hyperlipidemia     IBS (irritable bowel syndrome)     Otitis media     Rash     Shoulder pain, bilateral     Sleep apnea     no C-pap       Past Surgical History:   Procedure Laterality Date    ADENOIDECTOMY      AURAL ATRESIA REPAIR      Cervical Nerve Block      Paion Management    COLONOSCOPY      Epidural steroid injection      Pain management    LUMBAR SPINE SURGERY  2000    For stenosis    mastoids      Billateral    Nerve Block      Pain management    RADIOFREQUENCY ABLATION      of cervical nerve on left    ROTATOR CUFF REPAIR      bilaterally, Right x1, left X2    Steroid injection to both Shoulders      TONSILLECTOMY      TYMPANOSTOMY TUBE PLACEMENT         Current Outpatient Prescriptions   Medication Sig    ciprofloxacin HCl (CILOXAN) 0.3 % ophthalmic solution 5 drops to left EAR twice daily X 10 days    dicyclomine (BENTYL) 10 MG capsule Take 1 capsule (10 mg total) by mouth nightly.    diphenhydrAMINE (BENADRYL) 25 mg capsule Take 25 mg by mouth every 6 (six) hours as needed for Itching.    ergocalciferol (ERGOCALCIFEROL) 50,000 unit Cap Take 1 capsule (50,000 Units total) by mouth every 7 days.    escitalopram oxalate (LEXAPRO) 20 MG tablet Take 1 tablet (20 mg total) by mouth once daily.    etodolac (LODINE) 400 MG tablet Take 1 tablet (400 mg total) by mouth 2 (two) times daily.    fluticasone (FLONASE) 50 mcg/actuation nasal spray 2 sprays by Each Nare route once daily. At first sign of sore throat, increase to twice daily for a few days.  Once improved, return to maintenance dose of once daily    gabapentin (NEURONTIN) 100 MG capsule TAKE TWO CAPSULES BY MOUTH THREE TIMES DAILY    hydrocortisone (ANUSOL-HC) 25 mg suppository Place 1 suppository (25 mg total) rectally nightly. (Patient taking differently: Place 25 mg rectally 2 (two) times daily as needed. )    hydrocortisone 2.5 % cream Thin layer to AA face BID for 2 weeks or less as needed for flare    ibuprofen (ADVIL,MOTRIN) 200 MG tablet Take 200 mg by mouth every 6 (six) hours as needed for Pain.    ketoconazole (NIZORAL) 2 % shampoo Wash hair with medicated shampoo at least 2x/week - let sit on scalp at least 5 minutes prior to rinsing    loratadine (CLARITIN) 10 mg tablet Take 10 mg by mouth once daily.    MULTIVITAMIN W-MINERALS/LUTEIN (MULTIVITAMIN-MINERALS-LUTEIN ORAL) Take 1 tablet by mouth once daily.    mupirocin (BACTROBAN) 2 % ointment     omeprazole (PRILOSEC) 40 MG capsule Take 1 capsule (40 mg total) by mouth once daily.    rosuvastatin (CRESTOR) 10 MG tablet Take 1 tablet (10 mg total) by mouth once daily.    tizanidine (ZANAFLEX) 4 MG tablet TAKE ONE TABLET BY MOUTH ONCE DAILY AFTER  DINNER (Patient taking differently: TAKE ONE TABLET BY MOUTH ONCE DAILY AFTER  DINNER/ three times daily as needed)    tizanidine (ZANAFLEX) 4 MG tablet TAKE ONE TABLET BY MOUTH THREE TIMES DAILY AS NEEDED    VIT A/VIT C/VIT E/ZINC/COPPER (PRESERVISION AREDS ORAL) Take 1 tablet by mouth once daily.     albuterol (PROAIR HFA) 90 mcg/actuation inhaler Inhale 2 puffs into the lungs every 4 (four) hours as needed for Wheezing.     No current facility-administered medications for this visit.        Review of patient's allergies indicates:   Allergen Reactions    Adhesive      Blistering, tolerates tegaderm    Valdecoxib Rash       Family History   Problem Relation Age of Onset    Diabetes Mother     Heart disease Mother     Stroke Mother     Eczema Mother     Cancer Father       Pancreatic    Diabetes Brother     Lymphoma Brother     Heart attack Maternal Uncle     Cancer Maternal Uncle      Lung    Heart attack Maternal Uncle     Melanoma Neg Hx     Lupus Neg Hx     Psoriasis Neg Hx        Social History     Social History    Marital status:      Spouse name: N/A    Number of children: N/A    Years of education: N/A     Occupational History    [employer]      Social History Main Topics    Smoking status: Never Smoker    Smokeless tobacco: Former User     Types: Snuff, Chew     Quit date: 3/3/2006      Comment: Chewed tobacco for 30 years    Alcohol use 1.8 oz/week     3 Shots of liquor per week      Comment: socially     Drug use: No    Sexual activity: No     Other Topics Concern    Not on file     Social History Narrative       Chief Complaint:   Chief Complaint   Patient presents with    Right Shoulder - Pain       History: This is a 67-year-old male with a history of prior right shoulder pain.  Patient has had 2 surgeries on his left rotator cuff And a single surgery on his right rotator cuff.  Patient complains of about 8 days of severe pain.  Patient has been doing a lot of work around his house and seems to have aggravated shoulder.Rates the pain as a 10 out of 10.  Can't sleep.  Care raise his arm up.  Symptoms are severe and stable.  No recent treatment.    Present: The injection that we did back in December helped his left shoulder but not his right.  Pain is still to a 6 out of 10.  Still difficulty sleeping at night.  He is interested in hearing about his other options.    Review of Systems:    Constitution: Negative for chills, fever, and sweats.  Negative for unexplained weight loss.    HENT:  Negative for headaches and blurry vision.    Cardiovascular:Negative for chest pain or irregular heart beat. Negative for hypertension.    Respiratory:  Negative for cough and shortness of breath.    Gastrointestinal: Negative for abdominal pain, heartburn,  melena, nausea, and vomitting.    Genitourinary:  Negative bladder incontinence and dysuria.    Musculoskeletal:  Negative for joint pain.  Negative for arthritis, joint swelling, muscle weakness, and myalgias.      Neurological: Positive for numbness.    Psychiatric/Behavioral: Negative for depression.  The patient is not nervous/anxious.      Endocrine: Negative for polyuria    Hematologic/Lymphatic: Negative for bleeding problem.  Does not bruise/bleed easily.    Skin: Negative for poor would healing and rash      Physical Examination:    Vital Signs:    Vitals:    04/13/17 1108   BP: (!) 140/75   Pulse: 91       This a well-developed, well nourished patient in no acute distress.  They are alert and oriented and cooperative to examination.  Pt. walks without an antalgic gait.      Examination of right shoulder shows no rashes or erythema. There are no masses, ecchymosis, or atrophy. The patient has full range of motion in forward flexion, external rotation, and internal rotation to the mid T-spine. The patient has moderately positive impingement signs.Nontender to palpation over a.c. joint.  Passive range of motion: Forward flexion of 180°, external rotation at 90° of 90°, internal rotation of 50°, and external rotation at 0° of 50°. 2+ radial pulse. Intact axillary, radial, median and ulnar sensation. 4+ out of 5 resisted forward flexion, external rotation, and negative lift off test.Moderate crepitus.    X-rays: 4 views of bilateral shoulders are  reviewed which show some changes from prior surgery.  No severe arthritic changes noted.    Assessment:: Possible right rotator cuff tear      Plan:  I recommended an MRI of his right shoulder.  Patient is having symptoms more consistent with a tear that with peritendinitis.  Patient will follow up after the MRI is completed.

## 2017-04-17 ENCOUNTER — TELEPHONE (OUTPATIENT)
Dept: ORTHOPEDICS | Facility: CLINIC | Age: 67
End: 2017-04-17

## 2017-04-17 DIAGNOSIS — M25.512 ACUTE PAIN OF LEFT SHOULDER: Primary | ICD-10-CM

## 2017-04-17 NOTE — TELEPHONE ENCOUNTER
----- Message from Josseline Griffiths sent at 4/17/2017 12:07 PM CDT -----  Contact: patient  Patient calling in regards to both shoulders being painful. He is scheduled for an MRI for his right shoulder but he is requesting an order for to have both shoulders done. Please advise.  Call back .   Thanks!

## 2017-04-17 NOTE — TELEPHONE ENCOUNTER
Patient is scheduled to have an MRI of the right shoulder on Friday but is requesting to have the left done as well. Please advise. Thanks

## 2017-04-18 RX ORDER — HYDROCODONE BITARTRATE AND ACETAMINOPHEN 5; 325 MG/1; MG/1
1 TABLET ORAL EVERY 6 HOURS PRN
Qty: 20 TABLET | Refills: 0 | Status: SHIPPED | OUTPATIENT
Start: 2017-04-18 | End: 2017-04-26 | Stop reason: SDUPTHER

## 2017-04-18 RX ORDER — TIZANIDINE 4 MG/1
TABLET ORAL
Qty: 90 TABLET | Refills: 0 | Status: SHIPPED | OUTPATIENT
Start: 2017-04-18 | End: 2017-05-04 | Stop reason: SDUPTHER

## 2017-04-18 NOTE — TELEPHONE ENCOUNTER
Patient is having an MRI of his shoulder on Friday and is requesting pain medication. Please advise. Thanks

## 2017-04-18 NOTE — TELEPHONE ENCOUNTER
----- Message from Alexa Miranda sent at 4/18/2017  8:06 AM CDT -----  Contact: self  Patient [phone] is calling/he is asking if Dr Morejon would call a pain medication - for his shoulders - to pharmacy     [facility] [address] [place] - [id number]  [id number] [email or web address] [id number]  Phone: [phone] Fax: [phone]

## 2017-04-21 ENCOUNTER — HOSPITAL ENCOUNTER (OUTPATIENT)
Dept: RADIOLOGY | Facility: HOSPITAL | Age: 67
Discharge: HOME | End: 2017-04-21
Attending: ORTHOPAEDIC SURGERY
Payer: COMMERCIAL

## 2017-04-21 DIAGNOSIS — M25.511 RIGHT SHOULDER PAIN, UNSPECIFIED CHRONICITY: ICD-10-CM

## 2017-04-21 PROCEDURE — 73221 MRI JOINT UPR EXTREM W/O DYE: CPT | Mod: TC,RT

## 2017-04-21 PROCEDURE — 73221 MRI JOINT UPR EXTREM W/O DYE: CPT | Mod: 26,RT,, | Performed by: RADIOLOGY

## 2017-04-25 RX ORDER — GABAPENTIN 100 MG/1
CAPSULE ORAL
Qty: 180 CAPSULE | Refills: 0 | Status: SHIPPED | OUTPATIENT
Start: 2017-04-25 | End: 2017-05-23 | Stop reason: SDUPTHER

## 2017-04-26 RX ORDER — HYDROCODONE BITARTRATE AND ACETAMINOPHEN 5; 325 MG/1; MG/1
1 TABLET ORAL EVERY 6 HOURS PRN
Qty: 20 TABLET | Refills: 0 | Status: SHIPPED | OUTPATIENT
Start: 2017-04-26 | End: 2017-05-03 | Stop reason: SDUPTHER

## 2017-04-27 ENCOUNTER — OFFICE VISIT (OUTPATIENT)
Dept: ORTHOPEDICS | Facility: CLINIC | Age: 67
End: 2017-04-27
Payer: COMMERCIAL

## 2017-04-27 VITALS
HEIGHT: 72 IN | SYSTOLIC BLOOD PRESSURE: 137 MMHG | BODY MASS INDEX: 34.4 KG/M2 | WEIGHT: 254 LBS | HEART RATE: 86 BPM | DIASTOLIC BLOOD PRESSURE: 83 MMHG

## 2017-04-27 DIAGNOSIS — M19.011 ARTHRITIS OF RIGHT ACROMIOCLAVICULAR JOINT: ICD-10-CM

## 2017-04-27 DIAGNOSIS — M75.41 IMPINGEMENT SYNDROME OF RIGHT SHOULDER: ICD-10-CM

## 2017-04-27 DIAGNOSIS — M75.31 CALCIFIC TENDINITIS OF RIGHT SHOULDER: Primary | ICD-10-CM

## 2017-04-27 PROCEDURE — 1160F RVW MEDS BY RX/DR IN RCRD: CPT | Mod: S$GLB,,, | Performed by: ORTHOPAEDIC SURGERY

## 2017-04-27 PROCEDURE — 99999 PR PBB SHADOW E&M-EST. PATIENT-LVL III: CPT | Mod: PBBFAC,,, | Performed by: ORTHOPAEDIC SURGERY

## 2017-04-27 PROCEDURE — 99214 OFFICE O/P EST MOD 30 MIN: CPT | Mod: 57,S$GLB,, | Performed by: ORTHOPAEDIC SURGERY

## 2017-04-27 PROCEDURE — 3075F SYST BP GE 130 - 139MM HG: CPT | Mod: S$GLB,,, | Performed by: ORTHOPAEDIC SURGERY

## 2017-04-27 PROCEDURE — 3079F DIAST BP 80-89 MM HG: CPT | Mod: S$GLB,,, | Performed by: ORTHOPAEDIC SURGERY

## 2017-04-27 NOTE — PROGRESS NOTES
Past Medical History:   Diagnosis Date    ALLERGIC RHINITIS     Allergy     Anticoagulant long-term use     ASA 81mg, Naprosyn    Anxiety     Arthritis     Asthma     3/3/2014-no problem in years    BPH (benign prostatic hypertrophy)     Chronic upper back pain     Diverticulum     DJD (degenerative joint disease), lumbar     Fever blister     GERD (gastroesophageal reflux disease)     HEARING LOSS     wears hearing aids    Hyperlipidemia     IBS (irritable bowel syndrome)     Otitis media     Rash     Shoulder pain, bilateral     Sleep apnea     no C-pap       Past Surgical History:   Procedure Laterality Date    ADENOIDECTOMY      AURAL ATRESIA REPAIR      Cervical Nerve Block      Paion Management    COLONOSCOPY      Epidural steroid injection      Pain management    LUMBAR SPINE SURGERY  2000    For stenosis    mastoids      Billateral    Nerve Block      Pain management    RADIOFREQUENCY ABLATION      of cervical nerve on left    ROTATOR CUFF REPAIR      bilaterally, Right x1, left X2    Steroid injection to both Shoulders      TONSILLECTOMY      TYMPANOSTOMY TUBE PLACEMENT         Current Outpatient Prescriptions   Medication Sig    ciprofloxacin HCl (CILOXAN) 0.3 % ophthalmic solution 5 drops to left EAR twice daily X 10 days    dicyclomine (BENTYL) 10 MG capsule Take 1 capsule (10 mg total) by mouth nightly.    diphenhydrAMINE (BENADRYL) 25 mg capsule Take 25 mg by mouth every 6 (six) hours as needed for Itching.    ergocalciferol (ERGOCALCIFEROL) 50,000 unit Cap Take 1 capsule (50,000 Units total) by mouth every 7 days.    escitalopram oxalate (LEXAPRO) 20 MG tablet Take 1 tablet (20 mg total) by mouth once daily.    etodolac (LODINE) 400 MG tablet Take 1 tablet (400 mg total) by mouth 2 (two) times daily.    fluticasone (FLONASE) 50 mcg/actuation nasal spray 2 sprays by Each Nare route once daily. At first sign of sore throat, increase to twice daily for a few days.  Once improved, return to maintenance dose of once daily    gabapentin (NEURONTIN) 100 MG capsule TAKE TWO CAPSULES BY MOUTH THREE TIMES DAILY    hydrocodone-acetaminophen 5-325mg (NORCO) 5-325 mg per tablet Take 1 tablet by mouth every 6 (six) hours as needed for Pain.    hydrocortisone (ANUSOL-HC) 25 mg suppository Place 1 suppository (25 mg total) rectally nightly. (Patient taking differently: Place 25 mg rectally 2 (two) times daily as needed. )    hydrocortisone 2.5 % cream Thin layer to AA face BID for 2 weeks or less as needed for flare    ibuprofen (ADVIL,MOTRIN) 200 MG tablet Take 200 mg by mouth every 6 (six) hours as needed for Pain.    ketoconazole (NIZORAL) 2 % shampoo Wash hair with medicated shampoo at least 2x/week - let sit on scalp at least 5 minutes prior to rinsing    loratadine (CLARITIN) 10 mg tablet Take 10 mg by mouth once daily.    MULTIVITAMIN W-MINERALS/LUTEIN (MULTIVITAMIN-MINERALS-LUTEIN ORAL) Take 1 tablet by mouth once daily.    mupirocin (BACTROBAN) 2 % ointment     omeprazole (PRILOSEC) 40 MG capsule Take 1 capsule (40 mg total) by mouth once daily.    rosuvastatin (CRESTOR) 10 MG tablet Take 1 tablet (10 mg total) by mouth once daily.    tizanidine (ZANAFLEX) 4 MG tablet TAKE ONE TABLET BY MOUTH ONCE DAILY AFTER  DINNER (Patient taking differently: TAKE ONE TABLET BY MOUTH ONCE DAILY AFTER  DINNER/ three times daily as needed)    tizanidine (ZANAFLEX) 4 MG tablet TAKE ONE TABLET BY MOUTH THREE TIMES DAILY AS NEEDED    VIT A/VIT C/VIT E/ZINC/COPPER (PRESERVISION AREDS ORAL) Take 1 tablet by mouth once daily.     albuterol (PROAIR HFA) 90 mcg/actuation inhaler Inhale 2 puffs into the lungs every 4 (four) hours as needed for Wheezing.     No current facility-administered medications for this visit.        Review of patient's allergies indicates:   Allergen Reactions    Adhesive      Blistering, tolerates tegaderm    Valdecoxib Rash       Family History   Problem  Relation Age of Onset    Diabetes Mother     Heart disease Mother     Stroke Mother     Eczema Mother     Cancer Father      Pancreatic    Diabetes Brother     Lymphoma Brother     Heart attack Maternal Uncle     Cancer Maternal Uncle      Lung    Heart attack Maternal Uncle     Melanoma Neg Hx     Lupus Neg Hx     Psoriasis Neg Hx        Social History     Social History    Marital status:      Spouse name: N/A    Number of children: N/A    Years of education: N/A     Occupational History    [employer]      Social History Main Topics    Smoking status: Never Smoker    Smokeless tobacco: Former User     Types: Snuff, Chew     Quit date: 3/3/2006      Comment: Chewed tobacco for 30 years    Alcohol use 1.8 oz/week     3 Shots of liquor per week      Comment: socially     Drug use: No    Sexual activity: No     Other Topics Concern    Not on file     Social History Narrative       Chief Complaint:   Chief Complaint   Patient presents with    Shoulder Pain     R shoulder mri results       History: This is a 67-year-old male with a history of prior right shoulder pain.  Patient has had 2 surgeries on his left rotator cuff And a single surgery on his right rotator cuff.  Patient complains of about 8 days of severe pain.  Patient has been doing a lot of work around his house and seems to have aggravated shoulder.Rates the pain as a 10 out of 10.  Can't sleep.  Care raise his arm up.  Symptoms are severe and stable.  No recent treatment.    Present: The injection that we did back in December helped his left shoulder but not his right.  Pain is still to a 6 out of 10.  Still difficulty sleeping at night.  MRI did not show an acute tear but did show findings consistent with some impingement, arthritis, and calcific tendinitis.    Review of Systems:    Constitution: Negative for chills, fever, and sweats.  Negative for unexplained weight loss.    HENT:  Negative for headaches and blurry  vision.    Cardiovascular:Negative for chest pain or irregular heart beat. Negative for hypertension.    Respiratory:  Negative for cough and shortness of breath.    Gastrointestinal: Negative for abdominal pain, heartburn, melena, nausea, and vomitting.    Genitourinary:  Negative bladder incontinence and dysuria.    Musculoskeletal:  Negative for joint pain.  Negative for arthritis, joint swelling, muscle weakness, and myalgias.      Neurological: Positive for numbness.    Psychiatric/Behavioral: Negative for depression.  The patient is not nervous/anxious.      Endocrine: Negative for polyuria    Hematologic/Lymphatic: Negative for bleeding problem.  Does not bruise/bleed easily.    Skin: Negative for poor would healing and rash      Physical Examination:    Vital Signs:    Vitals:    04/27/17 0916   BP: 137/83   Pulse: 86       This a well-developed, well nourished patient in no acute distress.  They are alert and oriented and cooperative to examination.  Pt. walks without an antalgic gait.      Examination of right shoulder shows no rashes or erythema. There are no masses, ecchymosis, or atrophy. The patient has full range of motion in forward flexion, external rotation, and internal rotation to the mid T-spine. The patient has moderately positive impingement signs.Nontender to palpation over a.c. joint.  Passive range of motion: Forward flexion of 180°, external rotation at 90° of 90°, internal rotation of 50°, and external rotation at 0° of 50°. 2+ radial pulse. Intact axillary, radial, median and ulnar sensation. 4+ out of 5 resisted forward flexion, external rotation, and negative lift off test.Moderate crepitus.      Heart is regular rate and rhythm without abnormal murmurs rubs or gallops  Lungs are clear to auscultation bilaterally  Abdomen is soft nontender nondistended    X-rays: 4 views of bilateral shoulders are  reviewed which show some changes from prior surgery.  No severe arthritic changes  noted.    MRI of the right shoulder:1.  Moderate right a.c. joint osteoarthritis including inferiorly directed osteophyte formation which may predispose to impingement. There is mild right distal supraspinatus tendinopathy without evidence for tear.  2. Calcific tendinitis involving the infraspinatus tendon near the insertion again noted.  3. Mild right glenohumeral osteoarthritis.    Assessment:: Right shoulder impingement  Calcific tendinitis right shoulder    Plan:  I reviewed the MRI with him today.  Patient has signs of impingement both from the acromion as well as from the acromioclavicular joint.  He also has findings consistent with calcific tendinopathy of the infraspinatus.  We talked about treatment options.  Patient would like to go ahead and proceed with shoulder arthroscopy with subacromial decompression, distal clavicle excision, and possible rotator cuff debridement with repair.Risks, benefits, and alternatives to the procedure were explained to the patient including but not limited to damage to nerves, arteries, blood vessels, bones, tendons, ligaments, stiffness, instability, infection, DVT, PE, as well as general anesthetic complications including seizure, stroke, heart attack and even death. The patient understood these risks and wished to proceed and signed the informed consent.

## 2017-04-28 RX ORDER — CEFAZOLIN SODIUM 2 G/50ML
2 SOLUTION INTRAVENOUS
Status: CANCELLED | OUTPATIENT
Start: 2017-04-28

## 2017-05-01 ENCOUNTER — TELEPHONE (OUTPATIENT)
Dept: ORTHOPEDICS | Facility: CLINIC | Age: 67
End: 2017-05-01

## 2017-05-01 NOTE — TELEPHONE ENCOUNTER
Called and spoke with patient. He is to call the office back because he is on the phone at the moment.

## 2017-05-01 NOTE — TELEPHONE ENCOUNTER
----- Message from Linnette Perry sent at 5/1/2017  3:17 PM CDT -----  Contact: SELF [phone]  Pt is scheduled next month for surgery but he would like to schedule it earlier if possible/ please call thanks

## 2017-05-02 NOTE — TELEPHONE ENCOUNTER
----- Message from Stacey M Lefort sent at 5/2/2017  4:04 PM CDT -----  Contact: Patient   Patient would like a phone callback - he can be reached at [phone]. Thank you.

## 2017-05-03 ENCOUNTER — DOCUMENTATION ONLY (OUTPATIENT)
Dept: FAMILY MEDICINE | Facility: CLINIC | Age: 67
End: 2017-05-03

## 2017-05-03 RX ORDER — HYDROCODONE BITARTRATE AND ACETAMINOPHEN 5; 325 MG/1; MG/1
1 TABLET ORAL EVERY 6 HOURS PRN
Qty: 20 TABLET | Refills: 0 | Status: SHIPPED | OUTPATIENT
Start: 2017-05-03 | End: 2017-05-10 | Stop reason: SDUPTHER

## 2017-05-03 NOTE — PROGRESS NOTES
Pre-Visit Chart Review  For Appointment Scheduled on 05/04/2017    Health Maintenance Due   Topic Date Due    Abdominal Aortic Aneurysm Screening  01/29/2017

## 2017-05-04 ENCOUNTER — OFFICE VISIT (OUTPATIENT)
Dept: FAMILY MEDICINE | Facility: CLINIC | Age: 67
End: 2017-05-04
Payer: COMMERCIAL

## 2017-05-04 VITALS
WEIGHT: 254.88 LBS | TEMPERATURE: 98 F | HEART RATE: 111 BPM | SYSTOLIC BLOOD PRESSURE: 113 MMHG | HEIGHT: 72 IN | RESPIRATION RATE: 18 BRPM | DIASTOLIC BLOOD PRESSURE: 82 MMHG | BODY MASS INDEX: 34.52 KG/M2

## 2017-05-04 DIAGNOSIS — F32.A DEPRESSION, UNSPECIFIED DEPRESSION TYPE: ICD-10-CM

## 2017-05-04 PROCEDURE — 99213 OFFICE O/P EST LOW 20 MIN: CPT | Mod: S$GLB,,, | Performed by: FAMILY MEDICINE

## 2017-05-04 PROCEDURE — 3079F DIAST BP 80-89 MM HG: CPT | Mod: S$GLB,,, | Performed by: FAMILY MEDICINE

## 2017-05-04 PROCEDURE — 99999 PR PBB SHADOW E&M-EST. PATIENT-LVL III: CPT | Mod: PBBFAC,,, | Performed by: FAMILY MEDICINE

## 2017-05-04 PROCEDURE — 1160F RVW MEDS BY RX/DR IN RCRD: CPT | Mod: S$GLB,,, | Performed by: FAMILY MEDICINE

## 2017-05-04 PROCEDURE — 3074F SYST BP LT 130 MM HG: CPT | Mod: S$GLB,,, | Performed by: FAMILY MEDICINE

## 2017-05-04 RX ORDER — ESCITALOPRAM OXALATE 20 MG/1
20 TABLET ORAL DAILY
Qty: 90 TABLET | Refills: 3 | Status: SHIPPED | OUTPATIENT
Start: 2017-05-04 | End: 2018-03-13 | Stop reason: SDUPTHER

## 2017-05-04 NOTE — PROGRESS NOTES
[facility]  [facility]    Subjective:       Patient ID: Jorge Gipson is a 67 y.o. male.    Chief Complaint: depression follow up    HPI67 y.o.male patient is here today for a four-week depression follow-up.  Patient has recently been started on Lexapro.  Patient has been on Prozac for many years due to chronic pain.  Lexapro was increased in dose while Prozac was discontinued.  Since that time patient has been feeling well.  All patient's symptoms of depression and pain are under good control at the current time.  No acute complaints at today's visit.  Review of Systems   Constitutional: Negative for chills and fever.   HENT: Negative for congestion.    Eyes: Negative for pain.   Respiratory: Negative for shortness of breath and wheezing.    Cardiovascular: Negative for chest pain, palpitations and leg swelling.   Gastrointestinal: Negative for abdominal pain, nausea and vomiting.   Genitourinary: Negative for difficulty urinating.   Musculoskeletal: Negative for arthralgias, gait problem and myalgias.   Skin: Negative for rash.   Neurological: Negative for seizures.       Objective:      Vitals:    05/04/17 1246   BP: 113/82   BP Location: Right arm   Patient Position: Sitting   BP Method: Automatic   Pulse: (!) 111   Resp: 18   Temp: 98.2 °F (36.8 °C)   TempSrc: Oral   Weight: 115.6 kg (254 lb 13.6 oz)   Height: 6' (1.829 m)     Body mass index is 34.56 kg/(m^2).  Physical Exam   Constitutional: He is oriented to person, place, and time. He appears well-developed and well-nourished. No distress.   HENT:   Head: Normocephalic and atraumatic.   Cardiovascular: Normal rate, regular rhythm, normal heart sounds and intact distal pulses.  Exam reveals no gallop and no friction rub.    No murmur heard.  Pulmonary/Chest: Effort normal and breath sounds normal. No respiratory distress. He has no rales.   Abdominal: Soft. He exhibits no distension and no mass. There is no rebound and no guarding. No hernia.    Musculoskeletal: Normal range of motion.   Neurological: He is alert and oriented to person, place, and time.   Skin: Skin is warm.   Psychiatric: He has a normal mood and affect. His behavior is normal. Judgment and thought content normal.   Vitals reviewed.      Assessment:       1. Depression, unspecified depression type        Plan:       Depression, unspecified depression type  -     escitalopram oxalate (LEXAPRO) 20 MG tablet; Take 1 tablet (20 mg total) by mouth once daily.  Dispense: 90 tablet; Refill: 3      Return in about 5 months (around 10/4/2017).  Anselmo Devine MD  [facility]  5/4/2017 1:35 PM

## 2017-05-04 NOTE — MR AVS SNAPSHOT
[facility]  [address]  [facility] [address]  Phone: [phone]  Fax: [phone]                  Jorge Gipson   2017 12:40 PM   Office Visit    Description:  Male : 1950   Provider:  Anselmo Devine MD   Department:  [facility]           Reason for Visit     Follow-up           Diagnoses this Visit        Comments    Depression, unspecified depression type                To Do List           Future Appointments        Provider Department Dept Phone    10/5/2017 12:40 PM Anselmo Devine MD [facility] [phone]      Your Future Surgeries/Procedures     May 19, 2017   Surgery with Kuldip Morejon MD   [facility] ([facility]) [address]  [facility] [id number] [phone]              Goals (5 Years of Data)     None      Follow-Up and Disposition     Return in about 5 months (around 10/4/2017).       These Medications        Disp Refills Start End    escitalopram oxalate (LEXAPRO) 20 MG tablet 90 tablet 3 2017    Take 1 tablet (20 mg total) by mouth once daily. - Oral    Pharmacy: [facility] [address] [place] [address] Ph #: [phone]         [facility]     [facility] Care [facility] -  Assistance  Unless otherwise directed by your provider, please contact [facility], our nurse care line that is available for  assistance.     Registered nurses in the [facility] provide: appointment scheduling, clinical advisement, health education, and other advisory services.  Call: [phone] (toll free)               Medications           Message regarding Medications     Verify the changes and/or additions to your medication regime listed below are the same as discussed with your clinician today.  If any of these changes or additions are incorrect, please notify your healthcare provider.        STOP taking these  medications     ergocalciferol (ERGOCALCIFEROL) 50,000 unit Cap Take 1 capsule (50,000 Units total) by mouth every 7 days.           Verify that the below list of medications is an accurate representation of the medications you are currently taking.  If none reported, the list may be blank. If incorrect, please contact your healthcare provider. Carry this list with you in case of emergency.           Current Medications     albuterol (PROAIR HFA) 90 mcg/actuation inhaler Inhale 2 puffs into the lungs every 4 (four) hours as needed for Wheezing.    ciprofloxacin HCl (CILOXAN) 0.3 % ophthalmic solution 5 drops to left EAR twice daily X 10 days    dicyclomine (BENTYL) 10 MG capsule Take 1 capsule (10 mg total) by mouth nightly.    diphenhydrAMINE (BENADRYL) 25 mg capsule Take 25 mg by mouth every 6 (six) hours as needed for Itching.    escitalopram oxalate (LEXAPRO) 20 MG tablet Take 1 tablet (20 mg total) by mouth once daily.    etodolac (LODINE) 400 MG tablet Take 1 tablet (400 mg total) by mouth 2 (two) times daily.    fluticasone (FLONASE) 50 mcg/actuation nasal spray 2 sprays by Each Nare route once daily. At first sign of sore throat, increase to twice daily for a few days. Once improved, return to maintenance dose of once daily    gabapentin (NEURONTIN) 100 MG capsule TAKE TWO CAPSULES BY MOUTH THREE TIMES DAILY    hydrocodone-acetaminophen 5-325mg (NORCO) 5-325 mg per tablet Take 1 tablet by mouth every 6 (six) hours as needed for Pain.    hydrocortisone (ANUSOL-HC) 25 mg suppository Place 1 suppository (25 mg total) rectally nightly.    hydrocortisone 2.5 % cream Thin layer to AA face BID for 2 weeks or less as needed for flare    ibuprofen (ADVIL,MOTRIN) 200 MG tablet Take 200 mg by mouth every 6 (six) hours as needed for Pain.    ketoconazole (NIZORAL) 2 % shampoo Wash hair with medicated shampoo at least 2x/week - let sit on scalp at least 5 minutes prior to rinsing    loratadine (CLARITIN) 10 mg tablet  Take 10 mg by mouth once daily.    MULTIVITAMIN W-MINERALS/LUTEIN (MULTIVITAMIN-MINERALS-LUTEIN ORAL) Take 1 tablet by mouth once daily.    mupirocin (BACTROBAN) 2 % ointment     omeprazole (PRILOSEC) 40 MG capsule Take 1 capsule (40 mg total) by mouth once daily.    rosuvastatin (CRESTOR) 10 MG tablet Take 1 tablet (10 mg total) by mouth once daily.    tizanidine (ZANAFLEX) 4 MG tablet TAKE ONE TABLET BY MOUTH ONCE DAILY AFTER  DINNER    VIT A/VIT C/VIT E/ZINC/COPPER (PRESERVISION AREDS ORAL) Take 1 tablet by mouth once daily.            Clinical Reference Information           Your Vitals Were     BP Pulse Temp Resp Height Weight    113/82 (BP Location: Right arm, Patient Position: Sitting, BP Method: Automatic) 111 98.2 °F (36.8 °C) (Oral) 18 6' (1.829 m) 115.6 kg (254 lb 13.6 oz)    BMI                34.56 kg/m2          Blood Pressure          Most Recent Value    BP  113/82      Allergies as of 5/4/2017     Adhesive    Valdecoxib      Immunizations Administered on Date of Encounter - 5/4/2017     None      [facility] Sign-Up     Activating your [facility] account is as easy as 1-2-3!     1) Visit [email or web address], select Sign Up Now, enter this activation code and your date of birth, then select Next.  [email or web address]  Expires: 5/11/2017  1:54 PM      2) Create a username and password to use when you visit [facility] in the future and select a security question in case you lose your password and select Next.    3) Enter your e-mail address and click Sign Up!    Additional Information  If you have questions, please e-mail [email or web address] or call [phone] to talk to our [facility] staff. Remember, [facility] is NOT to be used for urgent needs. For medical emergencies, dial 911.         Language Assistance Services     ATTENTION: Language assistance services are available, free of charge. Please call [phone].      ATENCIÓN: Si habla español, tiene a wong disposición servicios gratuitos de asistencia  lingüística. Nestor al [phone].     LUCIA Ý: N?u b?n nói Ti?ng Vi?t, có các d?ch v? h? tr? ngôn ng? mi?n phí dành cho b?n. G?i s? [phone].         [facility] complies with applicable Federal civil rights laws and does not discriminate on the basis of race, color, national origin, age, disability, or sex.

## 2017-05-05 RX ORDER — TIZANIDINE 4 MG/1
TABLET ORAL
Qty: 90 TABLET | Refills: 0 | Status: SHIPPED | OUTPATIENT
Start: 2017-05-05 | End: 2017-05-10 | Stop reason: SDUPTHER

## 2017-05-10 ENCOUNTER — HOSPITAL ENCOUNTER (OUTPATIENT)
Dept: RADIOLOGY | Facility: HOSPITAL | Age: 67
Discharge: HOME | End: 2017-05-10
Attending: ORTHOPAEDIC SURGERY
Payer: COMMERCIAL

## 2017-05-10 ENCOUNTER — HOSPITAL ENCOUNTER (OUTPATIENT)
Dept: PREADMISSION TESTING | Facility: HOSPITAL | Age: 67
Discharge: HOME | End: 2017-05-10
Attending: ORTHOPAEDIC SURGERY
Payer: MEDICARE

## 2017-05-10 VITALS — BODY MASS INDEX: 34.34 KG/M2 | HEIGHT: 72 IN | WEIGHT: 253.5 LBS

## 2017-05-10 DIAGNOSIS — M75.31 CALCIFIC TENDINITIS OF RIGHT SHOULDER: ICD-10-CM

## 2017-05-10 DIAGNOSIS — M19.011 ARTHRITIS OF RIGHT ACROMIOCLAVICULAR JOINT: ICD-10-CM

## 2017-05-10 DIAGNOSIS — M75.41 IMPINGEMENT SYNDROME OF RIGHT SHOULDER: ICD-10-CM

## 2017-05-10 LAB
ANION GAP SERPL CALC-SCNC: 12 MMOL/L
BASOPHILS # BLD AUTO: 0 K/UL
BASOPHILS NFR BLD: 0.8 %
BUN SERPL-MCNC: 20 MG/DL
CALCIUM SERPL-MCNC: 9.3 MG/DL
CHLORIDE SERPL-SCNC: 107 MMOL/L
CO2 SERPL-SCNC: 23 MMOL/L
CREAT SERPL-MCNC: 0.9 MG/DL
DIFFERENTIAL METHOD: ABNORMAL
EOSINOPHIL # BLD AUTO: 0.2 K/UL
EOSINOPHIL NFR BLD: 2.9 %
ERYTHROCYTE [DISTWIDTH] IN BLOOD BY AUTOMATED COUNT: 13.4 %
EST. GFR  (AFRICAN AMERICAN): >60 ML/MIN/1.73 M^2
EST. GFR  (NON AFRICAN AMERICAN): >60 ML/MIN/1.73 M^2
GLUCOSE SERPL-MCNC: 87 MG/DL
HCT VFR BLD AUTO: 39.2 %
HGB BLD-MCNC: 13.3 G/DL
LYMPHOCYTES # BLD AUTO: 1.4 K/UL
LYMPHOCYTES NFR BLD: 24 %
MCH RBC QN AUTO: 31.1 PG
MCHC RBC AUTO-ENTMCNC: 34 %
MCV RBC AUTO: 91 FL
MONOCYTES # BLD AUTO: 0.4 K/UL
MONOCYTES NFR BLD: 6.9 %
NEUTROPHILS # BLD AUTO: 3.7 K/UL
NEUTROPHILS NFR BLD: 65.4 %
PLATELET # BLD AUTO: 248 K/UL
PMV BLD AUTO: 8.1 FL
POTASSIUM SERPL-SCNC: 4.3 MMOL/L
RBC # BLD AUTO: 4.29 M/UL
SODIUM SERPL-SCNC: 142 MMOL/L
WBC # BLD AUTO: 5.7 K/UL

## 2017-05-10 PROCEDURE — 80048 BASIC METABOLIC PNL TOTAL CA: CPT

## 2017-05-10 PROCEDURE — 85025 COMPLETE CBC W/AUTO DIFF WBC: CPT

## 2017-05-10 PROCEDURE — 93005 ELECTROCARDIOGRAM TRACING: CPT

## 2017-05-10 PROCEDURE — 99900103 DSU ONLY-NO CHARGE-INITIAL HR (STAT)

## 2017-05-10 PROCEDURE — 71020 XR CHEST PA AND LATERAL: CPT | Mod: TC

## 2017-05-10 PROCEDURE — 71020 XR CHEST PA AND LATERAL: CPT | Mod: 26,,, | Performed by: RADIOLOGY

## 2017-05-10 PROCEDURE — 93010 ELECTROCARDIOGRAM REPORT: CPT | Mod: ,,, | Performed by: INTERNAL MEDICINE

## 2017-05-10 PROCEDURE — 99900104 DSU ONLY-NO CHARGE-EA ADD'L HR (STAT)

## 2017-05-10 PROCEDURE — 36415 COLL VENOUS BLD VENIPUNCTURE: CPT

## 2017-05-10 RX ORDER — HYDROCODONE BITARTRATE AND ACETAMINOPHEN 5; 325 MG/1; MG/1
1 TABLET ORAL EVERY 6 HOURS PRN
Qty: 20 TABLET | Refills: 0 | Status: SHIPPED | OUTPATIENT
Start: 2017-05-10 | End: 2017-05-16

## 2017-05-10 NOTE — TELEPHONE ENCOUNTER
----- Message from Stacey M Lefort sent at 5/9/2017  3:29 PM CDT -----  Contact: Patient  Patient needs his pain medication called in to his [facility]. Thank you.

## 2017-05-10 NOTE — TELEPHONE ENCOUNTER
Patient is requesting a refill on pain medication. His last refill was [place] 5/325mg tabs #20 on 5/3/17

## 2017-05-16 RX ORDER — OXYCODONE AND ACETAMINOPHEN 5; 325 MG/1; MG/1
1 TABLET ORAL EVERY 4 HOURS PRN
Qty: 40 TABLET | Refills: 0 | Status: SHIPPED | OUTPATIENT
Start: 2017-05-18 | End: 2017-05-24 | Stop reason: SDUPTHER

## 2017-05-16 NOTE — TELEPHONE ENCOUNTER
Ok, he is also asking for something stronger than Norco 5-325mg for after surgery. Please advise.

## 2017-05-16 NOTE — TELEPHONE ENCOUNTER
----- Message from Merlyn Haynes sent at 5/16/2017 11:26 AM CDT -----  Contact: self  Patient would like to discuss post op pain mgmt

## 2017-05-16 NOTE — TELEPHONE ENCOUNTER
Pt scheduled for shoulder scope on Friday. He is asking that his post op pain medication be called into the pharmacy 1 day prior to surgery so he can have it filled and ready for after his sx. Stated he has no way to get it from pharmacy after the surgery. Please advise.

## 2017-05-17 RX ORDER — DICYCLOMINE HYDROCHLORIDE 10 MG/1
CAPSULE ORAL
Qty: 90 CAPSULE | Refills: 0 | Status: SHIPPED | OUTPATIENT
Start: 2017-05-17 | End: 2017-06-28 | Stop reason: SDUPTHER

## 2017-05-18 ENCOUNTER — ANESTHESIA EVENT (OUTPATIENT)
Dept: SURGERY | Facility: HOSPITAL | Age: 67
End: 2017-05-18
Payer: COMMERCIAL

## 2017-05-19 ENCOUNTER — ANESTHESIA (OUTPATIENT)
Dept: SURGERY | Facility: HOSPITAL | Age: 67
End: 2017-05-19
Payer: MEDICARE

## 2017-05-19 ENCOUNTER — HOSPITAL ENCOUNTER (OUTPATIENT)
Facility: HOSPITAL | Age: 67
Discharge: HOME | End: 2017-05-19
Attending: ORTHOPAEDIC SURGERY | Admitting: ORTHOPAEDIC SURGERY
Payer: MEDICARE

## 2017-05-19 ENCOUNTER — SURGERY (OUTPATIENT)
Age: 67
End: 2017-05-19

## 2017-05-19 VITALS
WEIGHT: 253 LBS | OXYGEN SATURATION: 95 % | TEMPERATURE: 99 F | RESPIRATION RATE: 12 BRPM | HEART RATE: 96 BPM | DIASTOLIC BLOOD PRESSURE: 76 MMHG | SYSTOLIC BLOOD PRESSURE: 161 MMHG | BODY MASS INDEX: 34.27 KG/M2 | HEIGHT: 72 IN

## 2017-05-19 DIAGNOSIS — M75.31 CALCIFIC TENDINITIS OF RIGHT SHOULDER: Primary | ICD-10-CM

## 2017-05-19 DIAGNOSIS — M75.41 IMPINGEMENT SYNDROME OF RIGHT SHOULDER: ICD-10-CM

## 2017-05-19 DIAGNOSIS — M19.011 ARTHRITIS OF RIGHT ACROMIOCLAVICULAR JOINT: ICD-10-CM

## 2017-05-19 PROCEDURE — 99900103 DSU ONLY-NO CHARGE-INITIAL HR (STAT): Performed by: ORTHOPAEDIC SURGERY

## 2017-05-19 PROCEDURE — 27201423 OPTIME MED/SURG SUP & DEVICES STERILE SUPPLY: Performed by: ORTHOPAEDIC SURGERY

## 2017-05-19 PROCEDURE — 71000033 HC RECOVERY, INTIAL HOUR: Performed by: ORTHOPAEDIC SURGERY

## 2017-05-19 PROCEDURE — 29826 SHO ARTHRS SRG DECOMPRESSION: CPT | Mod: RT,,, | Performed by: ORTHOPAEDIC SURGERY

## 2017-05-19 PROCEDURE — 25000003 PHARM REV CODE 250: Performed by: ANESTHESIOLOGY

## 2017-05-19 PROCEDURE — 29824 SHO ARTHRS SRG DSTL CLAVICLC: CPT | Mod: RT,,, | Performed by: ORTHOPAEDIC SURGERY

## 2017-05-19 PROCEDURE — 99900104 DSU ONLY-NO CHARGE-EA ADD'L HR (STAT): Performed by: ORTHOPAEDIC SURGERY

## 2017-05-19 PROCEDURE — 63600175 PHARM REV CODE 636 W HCPCS: Performed by: NURSE ANESTHETIST, CERTIFIED REGISTERED

## 2017-05-19 PROCEDURE — 63600175 PHARM REV CODE 636 W HCPCS: Performed by: ORTHOPAEDIC SURGERY

## 2017-05-19 PROCEDURE — D9220A PRA ANESTHESIA: Mod: CRNA,,, | Performed by: NURSE ANESTHETIST, CERTIFIED REGISTERED

## 2017-05-19 PROCEDURE — 63600175 PHARM REV CODE 636 W HCPCS: Performed by: ANESTHESIOLOGY

## 2017-05-19 PROCEDURE — 29823 SHO ARTHRS SRG XTNSV DBRDMT: CPT | Mod: 51,RT,, | Performed by: ORTHOPAEDIC SURGERY

## 2017-05-19 PROCEDURE — 76942 ECHO GUIDE FOR BIOPSY: CPT | Performed by: ANESTHESIOLOGY

## 2017-05-19 PROCEDURE — 27200750 HC INSULATED NEEDLE/ STIMUPLEX: Performed by: ANESTHESIOLOGY

## 2017-05-19 PROCEDURE — 71000015 HC POSTOP RECOV 1ST HR: Performed by: ORTHOPAEDIC SURGERY

## 2017-05-19 PROCEDURE — 37000008 HC ANESTHESIA 1ST 15 MINUTES: Performed by: ORTHOPAEDIC SURGERY

## 2017-05-19 PROCEDURE — D9220A PRA ANESTHESIA: Mod: ANES,,, | Performed by: ANESTHESIOLOGY

## 2017-05-19 PROCEDURE — 37000009 HC ANESTHESIA EA ADD 15 MINS: Performed by: ORTHOPAEDIC SURGERY

## 2017-05-19 PROCEDURE — 36000711: Performed by: ORTHOPAEDIC SURGERY

## 2017-05-19 PROCEDURE — 71000039 HC RECOVERY, EACH ADD'L HOUR: Performed by: ORTHOPAEDIC SURGERY

## 2017-05-19 PROCEDURE — 25000003 PHARM REV CODE 250: Performed by: NURSE ANESTHETIST, CERTIFIED REGISTERED

## 2017-05-19 PROCEDURE — 64415 NJX AA&/STRD BRCH PLXS IMG: CPT | Mod: 59,RT,, | Performed by: ANESTHESIOLOGY

## 2017-05-19 PROCEDURE — 36000710: Performed by: ORTHOPAEDIC SURGERY

## 2017-05-19 RX ORDER — METOCLOPRAMIDE HYDROCHLORIDE 5 MG/ML
10 INJECTION INTRAMUSCULAR; INTRAVENOUS EVERY 10 MIN PRN
Status: DISCONTINUED | OUTPATIENT
Start: 2017-05-19 | End: 2017-05-19 | Stop reason: HOSPADM

## 2017-05-19 RX ORDER — KETAMINE HYDROCHLORIDE 10 MG/ML
INJECTION, SOLUTION INTRAMUSCULAR; INTRAVENOUS
Status: DISCONTINUED | OUTPATIENT
Start: 2017-05-19 | End: 2017-05-19

## 2017-05-19 RX ORDER — ROCURONIUM BROMIDE 10 MG/ML
INJECTION, SOLUTION INTRAVENOUS
Status: DISCONTINUED | OUTPATIENT
Start: 2017-05-19 | End: 2017-05-19

## 2017-05-19 RX ORDER — HYDROCODONE BITARTRATE AND ACETAMINOPHEN 5; 325 MG/1; MG/1
1 TABLET ORAL EVERY 4 HOURS PRN
Status: CANCELLED | OUTPATIENT
Start: 2017-05-19

## 2017-05-19 RX ORDER — DIPHENHYDRAMINE HYDROCHLORIDE 50 MG/ML
12.5 INJECTION INTRAMUSCULAR; INTRAVENOUS ONCE
Status: COMPLETED | OUTPATIENT
Start: 2017-05-19 | End: 2017-05-19

## 2017-05-19 RX ORDER — SODIUM CHLORIDE 0.9 % (FLUSH) 0.9 %
3 SYRINGE (ML) INJECTION
Status: DISCONTINUED | OUTPATIENT
Start: 2017-05-19 | End: 2017-05-19 | Stop reason: HOSPADM

## 2017-05-19 RX ORDER — FENTANYL CITRATE 50 UG/ML
INJECTION, SOLUTION INTRAMUSCULAR; INTRAVENOUS
Status: DISCONTINUED | OUTPATIENT
Start: 2017-05-19 | End: 2017-05-19

## 2017-05-19 RX ORDER — SUCCINYLCHOLINE CHLORIDE 20 MG/ML
INJECTION INTRAMUSCULAR; INTRAVENOUS
Status: DISCONTINUED | OUTPATIENT
Start: 2017-05-19 | End: 2017-05-19

## 2017-05-19 RX ORDER — OXYCODONE AND ACETAMINOPHEN 5; 325 MG/1; MG/1
1 TABLET ORAL
Status: DISCONTINUED | OUTPATIENT
Start: 2017-05-19 | End: 2017-05-19 | Stop reason: HOSPADM

## 2017-05-19 RX ORDER — CEFAZOLIN SODIUM 2 G/50ML
2 SOLUTION INTRAVENOUS
Status: COMPLETED | OUTPATIENT
Start: 2017-05-19 | End: 2017-05-19

## 2017-05-19 RX ORDER — KETOROLAC TROMETHAMINE 30 MG/ML
INJECTION, SOLUTION INTRAMUSCULAR; INTRAVENOUS
Status: DISCONTINUED | OUTPATIENT
Start: 2017-05-19 | End: 2017-05-19

## 2017-05-19 RX ORDER — PROPOFOL 10 MG/ML
VIAL (ML) INTRAVENOUS
Status: DISCONTINUED | OUTPATIENT
Start: 2017-05-19 | End: 2017-05-19

## 2017-05-19 RX ORDER — SODIUM CHLORIDE, SODIUM LACTATE, POTASSIUM CHLORIDE, CALCIUM CHLORIDE 600; 310; 30; 20 MG/100ML; MG/100ML; MG/100ML; MG/100ML
INJECTION, SOLUTION INTRAVENOUS CONTINUOUS
Status: DISCONTINUED | OUTPATIENT
Start: 2017-05-19 | End: 2017-05-19 | Stop reason: HOSPADM

## 2017-05-19 RX ORDER — LIDOCAINE HCL/PF 100 MG/5ML
SYRINGE (ML) INTRAVENOUS
Status: DISCONTINUED | OUTPATIENT
Start: 2017-05-19 | End: 2017-05-19

## 2017-05-19 RX ORDER — DEXAMETHASONE SODIUM PHOSPHATE 4 MG/ML
INJECTION, SOLUTION INTRA-ARTICULAR; INTRALESIONAL; INTRAMUSCULAR; INTRAVENOUS; SOFT TISSUE
Status: DISCONTINUED | OUTPATIENT
Start: 2017-05-19 | End: 2017-05-19

## 2017-05-19 RX ORDER — EPINEPHRINE 1 MG/ML
INJECTION, SOLUTION INTRACARDIAC; INTRAMUSCULAR; INTRAVENOUS; SUBCUTANEOUS
Status: DISCONTINUED | OUTPATIENT
Start: 2017-05-19 | End: 2017-05-19 | Stop reason: HOSPADM

## 2017-05-19 RX ORDER — ONDANSETRON HYDROCHLORIDE 2 MG/ML
INJECTION, SOLUTION INTRAMUSCULAR; INTRAVENOUS
Status: DISCONTINUED | OUTPATIENT
Start: 2017-05-19 | End: 2017-05-19

## 2017-05-19 RX ORDER — FENTANYL CITRATE 50 UG/ML
25 INJECTION, SOLUTION INTRAMUSCULAR; INTRAVENOUS EVERY 5 MIN PRN
Status: COMPLETED | OUTPATIENT
Start: 2017-05-19 | End: 2017-05-19

## 2017-05-19 RX ORDER — LIDOCAINE HYDROCHLORIDE 10 MG/ML
1 INJECTION, SOLUTION EPIDURAL; INFILTRATION; INTRACAUDAL; PERINEURAL ONCE
Status: COMPLETED | OUTPATIENT
Start: 2017-05-19 | End: 2017-05-19

## 2017-05-19 RX ADMIN — PROPOFOL 200 MG: 10 INJECTION, EMULSION INTRAVENOUS at 09:05

## 2017-05-19 RX ADMIN — LIDOCAINE HYDROCHLORIDE 10 MG: 10 INJECTION, SOLUTION EPIDURAL; INFILTRATION; INTRACAUDAL; PERINEURAL at 08:05

## 2017-05-19 RX ADMIN — ROCURONIUM BROMIDE 10 MG: 10 INJECTION, SOLUTION INTRAVENOUS at 09:05

## 2017-05-19 RX ADMIN — KETOROLAC TROMETHAMINE 30 MG: 30 INJECTION, SOLUTION INTRAMUSCULAR; INTRAVENOUS at 10:05

## 2017-05-19 RX ADMIN — SODIUM CHLORIDE, SODIUM LACTATE, POTASSIUM CHLORIDE, AND CALCIUM CHLORIDE: .6; .31; .03; .02 INJECTION, SOLUTION INTRAVENOUS at 10:05

## 2017-05-19 RX ADMIN — FENTANYL CITRATE 25 MCG: 50 INJECTION INTRAMUSCULAR; INTRAVENOUS at 10:05

## 2017-05-19 RX ADMIN — SODIUM CHLORIDE, SODIUM LACTATE, POTASSIUM CHLORIDE, AND CALCIUM CHLORIDE: .6; .31; .03; .02 INJECTION, SOLUTION INTRAVENOUS at 08:05

## 2017-05-19 RX ADMIN — SUCCINYLCHOLINE CHLORIDE 120 MG: 20 INJECTION, SOLUTION INTRAMUSCULAR; INTRAVENOUS at 09:05

## 2017-05-19 RX ADMIN — EPINEPHRINE 1 MG: 1 INJECTION, SOLUTION INTRAMUSCULAR; SUBCUTANEOUS at 10:05

## 2017-05-19 RX ADMIN — CEFAZOLIN SODIUM 2 G: 2 SOLUTION INTRAVENOUS at 09:05

## 2017-05-19 RX ADMIN — OXYCODONE HYDROCHLORIDE AND ACETAMINOPHEN 1 TABLET: 5; 325 TABLET ORAL at 10:05

## 2017-05-19 RX ADMIN — FENTANYL CITRATE 25 MCG: 50 INJECTION INTRAMUSCULAR; INTRAVENOUS at 11:05

## 2017-05-19 RX ADMIN — LIDOCAINE HYDROCHLORIDE 50 MG: 20 INJECTION, SOLUTION INTRAVENOUS at 09:05

## 2017-05-19 RX ADMIN — FENTANYL CITRATE 100 MCG: 50 INJECTION INTRAMUSCULAR; INTRAVENOUS at 10:05

## 2017-05-19 RX ADMIN — ONDANSETRON 4 MG: 2 INJECTION, SOLUTION INTRAMUSCULAR; INTRAVENOUS at 09:05

## 2017-05-19 RX ADMIN — EPINEPHRINE 1 MG: 1 INJECTION, SOLUTION INTRAMUSCULAR; SUBCUTANEOUS at 09:05

## 2017-05-19 RX ADMIN — KETAMINE HYDROCHLORIDE 30 MG: 10 INJECTION, SOLUTION INTRAMUSCULAR; INTRAVENOUS at 10:05

## 2017-05-19 RX ADMIN — DIPHENHYDRAMINE HYDROCHLORIDE 12.5 MG: 50 INJECTION, SOLUTION INTRAMUSCULAR; INTRAVENOUS at 11:05

## 2017-05-19 RX ADMIN — FENTANYL CITRATE 50 MCG: 50 INJECTION INTRAMUSCULAR; INTRAVENOUS at 09:05

## 2017-05-19 RX ADMIN — DEXAMETHASONE SODIUM PHOSPHATE 4 MG: 4 INJECTION, SOLUTION INTRAMUSCULAR; INTRAVENOUS at 09:05

## 2017-05-19 NOTE — OP NOTE
[facility]  Orthopedic Surgery  Operative Note    SUMMARY     Date of Procedure: 5/19/2017     Procedure: Procedure(s) (LRB):  ARTHROSCOPY-SHOULDER WITH SUBACROMIAL DECOMPRESSION (Right)  [id number] END (Right)     R extensive debridement    Surgeon(s) and Role:     * Kuldip Morejon MD - Primary    Assistant: Wayne Stone    Pre-Operative Diagnosis: Impingement syndrome of right shoulder [M75.41]  Calcific tendinitis of right shoulder [M75.31]  Arthritis of right acromioclavicular joint [M19.011]    Post-Operative Diagnosis: Post-Op Diagnosis Codes:     * Impingement syndrome of right shoulder [M75.41]     * Calcific tendinitis of right shoulder [M75.31]     * Arthritis of right acromioclavicular joint [M19.011]    Anesthesia: General      Description of the Findings of the Procedure: Diagnostic arthroscopy findings on the patient's Right shoulder showed well   maintained labrum, biceps tendon and subscapularis. The patient had normal  articular appearance of the rotator cuff. Articular surface was normal. In the subacromial space, the patient had severe bursitis in the subacromial space with a type 2 acromion. Ac joint showed moderate arthritic changes. Bursal surface of the rotator cuff was intact with some calcific deposits in the infraspinatus that did not create large hole once poked with spinal needle.      Complications: No    Estimated Blood Loss (EBL): 10 mL           Implants: * No implants in log *    Specimens:   Specimen     None                  Condition: Good    Disposition: PACU - hemodynamically stable.    Attestation: I was present and scrubbed for the entire procedure.      Indications for the procedure:A 67 year old male with a history ofRight shoulder pain that failed to resolve with physical therapy, activity modification, injections, and rest.  Patient desired the procedure listed above after MRI was obtained.    PROCEDURE IN DETAIL: Risks,  benefits and alternatives of the procedure were   explained to the patient including, but not limited to damage to nerves,   arteries and blood vessels. Also explained risk of re-rupture, nonhealing, infection, DVT,   PE as well as anesthetic complications including seizure, stroke, heart attack   and death. They understood this, signed informed consent. The patient's Right  shoulder was marked prior to coming to the Operating Room. Once there a formal   timeout was done in which correct patient, procedure and operative site were all   correctly identified and confirmed by the entire operating team. Ancef 2 g was   given prior to surgical incision. General anesthesia was induced. The   patient was placed in lateral decubitus position on a bean bag with his Right upper extremity   hanging in traction.The arm was suspended with 10 lbs of weight for balanced traction. The extremity was prepped and draped in normal   sterile fashion. Glenohumeral joint was insufflated   with sterile saline. A standard posterior portal was then made. A spinal   needle was used to localize an anterior portal within the rotator interval and   this was made as well. We then performed a diagnostic arthroscopy of the   glenohumeral joint through both the anterior and posterior viewing portals,with the findings listed above.Shaver was used to debride some frayed labrum.    After this was done, we then proceeded up in the subacromial space   where a spinal needle was used to localize a lateral portal and then this was   made as well. A 4.0 shaver was used to perform a subtotal bursectomy. We then   used the ablator to remove the soft tissue off the undersurface of the acromion   and then the 4.0 bur was used to turn a type 2 acromion into a type 1 acromion,   smooth off the undersurface.The coracoacromial ligament was identified and disected free of the anterior acromion with the electrocautery device.We then thoroughly inspected the cuff on  the rotator cuff side. We shaved away any additional adhesions in the anterior, lateral on posterior gutters.  We were unable to find any evidence for a bursal sided cuff tear.Anterior portal was redirected toward the AC joint. Louis was placed through the anterior portal. 5mm of distal clavicle was excised and no further articulation between the clavicle and the acromion was left. A spinal needle was then passed through the lateral portal and penetrated the cuff in the areas of the calcific deposits and some white material was removed and then probe was used to milk the tendon/ Shaver was then used to debride the area. It was only about 20% of the tendon depth.           All excess fluid was removed from the shoulder. The portals were closed using nylon. Sterile dressing applied.  They were then placed in a cryotherapy cuff with   UltraSling, extubated, awakened and transferred from the Operating Room to   Recovery Room in stable condition.     Postoperative rehab course will be for SAD/DCE/ED

## 2017-05-19 NOTE — ANESTHESIA POSTPROCEDURE EVALUATION
Anesthesia Post Evaluation    Patient: Jorge Gipson    Procedure(s) Performed: Procedure(s) (LRB):  ARTHROSCOPY-SHOULDER WITH SUBACROMIAL DECOMPRESSION (Right)  [id number] END (Right)    Final Anesthesia Type: general  Patient location during evaluation: PACU  Patient participation: Yes- Able to Participate  Level of consciousness: awake and alert  Post-procedure vital signs: reviewed and stable  Pain management: adequate  Airway patency: patent  PONV status at discharge: No PONV  Anesthetic complications: no      Cardiovascular status: hemodynamically stable  Respiratory status: unassisted and room air  Hydration status: euvolemic  Follow-up not needed.        Visit Vitals    BP (!) 161/76    Pulse 96    Temp 37.2 °C (99 °F) (Temporal)    Resp 12    Ht 6' (1.829 m)    Wt 114.8 kg (253 lb)    SpO2 95%    BMI 34.31 kg/m2       Pain/Mei Score: Pain Assessment Performed: Yes (5/19/2017 11:59 AM)  Presence of Pain: denies (5/19/2017 12:38 PM)  Pain Rating Prior to Med Admin: 1 (5/19/2017 11:50 AM)  Mei Score: 10 (5/19/2017 12:38 PM)

## 2017-05-19 NOTE — ANESTHESIA PREPROCEDURE EVALUATION
05/19/2017  Jorge Gipson is a 67 y.o., male.    Anesthesia Evaluation    I have reviewed the Patient Summary Reports.    I have reviewed the Nursing Notes.   I have reviewed the Medications.     Review of Systems  Anesthesia Hx:  PONV Denies Family Hx of Anesthesia complications.    Cardiovascular:  Cardiovascular Normal  ECG has been reviewed.    Pulmonary:   Asthma mild and moderate Sleep Apnea    Hepatic/GI:   GERD, well controlled    Musculoskeletal:   Arthritis     Endocrine:  Endocrine Normal    Psych:   Psychiatric History          Physical Exam  General:  Obesity    Airway/Jaw/Neck:  Airway Findings: Mouth Opening: Normal Tongue: Normal  General Airway Assessment: Adult  Mallampati: III  TM Distance: Normal, at least 6 cm  Jaw/Neck Findings:  Neck ROM: Extension Decreased, Mild      Dental:  Dental Findings:   Chest/Lungs:  Chest/Lungs Clear    Heart/Vascular:  Heart Findings: Normal            Anesthesia Plan  Type of Anesthesia, risks & benefits discussed:  Anesthesia Type:  general  Patient's Preference:   Intra-op Monitoring Plan: standard ASA monitors  Intra-op Monitoring Plan Comments:   Post Op Pain Control Plan: single-shot nerve block  Post Op Pain Control Plan Comments:   Induction:   IV  Beta Blocker:  Patient is on a Beta-Blocker and has received one dose within the past 24 hours (No further documentation required).       Informed Consent: Patient understands risks and agrees with Anesthesia plan.  Questions answered. Anesthesia consent signed with patient.  ASA Score: 2     Day of Surgery Review of History & Physical:    H&P update referred to the surgeon.         Ready For Surgery From Anesthesia Perspective.

## 2017-05-19 NOTE — IP AVS SNAPSHOT
[address] [place] [address]  Phone: [phone]           Patient Discharge Instructions   Our goal is to set you up for success. This packet includes information on your condition, medications, and your home care.  It will help you care for yourself to prevent having to return to the hospital.     Please ask your nurse if you have any questions.      There are many details to remember when preparing to leave the hospital. Here is what you will need to do:    1. Take your medicine. If you are prescribed medications, review your Medication List on the following pages. You may have new medications to  at the pharmacy and others that you'll need to stop taking. Review the instructions for how and when to take your medications. Talk with your doctor or nurses if you are unsure of what to do.     2. Go to your follow-up appointments. Specific follow-up information is listed in the following pages. Your may be contacted by a nurse or clinical provider about future appointments. Be sure we have all of the phone numbers to reach you. Please contact your provider's office if you are unable to make an appointment.     3. Watch for warning signs. Your doctor or nurse will give you detailed warning signs to watch for and when to call for assistance. These instructions may also include educational information about your condition. If you experience any of warning signs to your health, call your doctor.           [facility]  Unless otherwise directed by your provider, please   contact [facility], our nurse care line   that is available for 24/7 assistance.     [phone] (toll-free)     Registered nurses in the [facility]   provide: appointment scheduling, clinical advisement, health education, and other advisory services.                  ** Verify the list of medication(s) below is accurate and up to date. Carry this with you in case of  emergency. If your medications have changed, please notify your healthcare provider.             Medication List      CHANGE how you take these medications        Additional Info                      ciprofloxacin HCl 0.3 % ophthalmic solution   Commonly known as:  CILOXAN   Quantity:  10 mL   Refills:  0   What changed:    - when to take this  - reasons to take this  - additional instructions    Instructions:  5 drops to left EAR twice daily X 10 days     Begin Date    AM    Noon    PM    Bedtime       hydrocortisone 25 mg suppository   Commonly known as:  ANUSOL-HC   Quantity:  12 suppository   Refills:  10   Dose:  25 mg   What changed:    - when to take this  - reasons to take this    Instructions:  Place 1 suppository (25 mg total) rectally nightly.     Begin Date    AM    Noon    PM    Bedtime       tizanidine 4 MG tablet   Commonly known as:  ZANAFLEX   Quantity:  30 tablet   Refills:  0   What changed:  See the new instructions.    Instructions:  TAKE ONE TABLET BY MOUTH ONCE DAILY AFTER  DINNER     Begin Date    AM    Noon    PM    Bedtime         CONTINUE taking these medications        Additional Info                      albuterol 90 mcg/actuation inhaler   Commonly known as:  PROAIR HFA   Quantity:  18 g   Refills:  6   Dose:  2 puff    Instructions:  Inhale 2 puffs into the lungs every 4 (four) hours as needed for Wheezing.     Begin Date    AM    Noon    PM    Bedtime       dicyclomine 10 MG capsule   Commonly known as:  BENTYL   Quantity:  90 capsule   Refills:  0    Instructions:  TAKE 1 CAPSULE BY MOUTH NIGHTLY     Begin Date    AM    Noon    PM    Bedtime       diphenhydrAMINE 25 mg capsule   Commonly known as:  BENADRYL   Refills:  0   Dose:  25 mg    Instructions:  Take 25 mg by mouth every 6 (six) hours as needed for Itching.     Begin Date    AM    Noon    PM    Bedtime       escitalopram oxalate 20 MG tablet   Commonly known as:  LEXAPRO   Quantity:  90 tablet   Refills:  3   Dose:  20 mg     Instructions:  Take 1 tablet (20 mg total) by mouth once daily.     Begin Date    AM    Noon    PM    Bedtime       etodolac 400 MG tablet   Commonly known as:  LODINE   Quantity:  60 tablet   Refills:  2   Dose:  400 mg    Instructions:  Take 1 tablet (400 mg total) by mouth 2 (two) times daily.     Begin Date    AM    Noon    PM    Bedtime       fluticasone 50 mcg/actuation nasal spray   Commonly known as:  FLONASE   Quantity:  48 g   Refills:  3   Dose:  2 spray    Instructions:  2 sprays by Each Nare route once daily. At first sign of sore throat, increase to twice daily for a few days. Once improved, return to maintenance dose of once daily     Begin Date    AM    Noon    PM    Bedtime       gabapentin 100 MG capsule   Commonly known as:  NEURONTIN   Quantity:  180 capsule   Refills:  0    Instructions:  TAKE TWO CAPSULES BY MOUTH THREE TIMES DAILY     Begin Date    AM    Noon    PM    Bedtime       hydrocortisone 2.5 % cream   Quantity:  28 g   Refills:  2    Instructions:  Thin layer to AA face BID for 2 weeks or less as needed for flare     Begin Date    AM    Noon    PM    Bedtime       ibuprofen 200 MG tablet   Commonly known as:  ADVIL,MOTRIN   Refills:  0   Dose:  200 mg    Instructions:  Take 200 mg by mouth every 6 (six) hours as needed for Pain.     Begin Date    AM    Noon    PM    Bedtime       ketoconazole 2 % shampoo   Commonly known as:  NIZORAL   Quantity:  120 mL   Refills:  5    Instructions:  Wash hair with medicated shampoo at least 2x/week - let sit on scalp at least 5 minutes prior to rinsing     Begin Date    AM    Noon    PM    Bedtime       loratadine 10 mg tablet   Commonly known as:  CLARITIN   Refills:  0   Dose:  10 mg    Instructions:  Take 10 mg by mouth daily as needed.     Begin Date    AM    Noon    PM    Bedtime       MULTIVITAMIN-MINERALS-LUTEIN ORAL   Refills:  0   Dose:  1 tablet    Instructions:  Take 1 tablet by mouth once daily.     Begin Date    AM    Noon    PM     Bedtime       mupirocin 2 % ointment   Commonly known as:  BACTROBAN   Refills:  0      Begin Date    AM    Noon    PM    Bedtime       omeprazole 40 MG capsule   Commonly known as:  PRILOSEC   Quantity:  90 capsule   Refills:  3   Dose:  40 mg    Instructions:  Take 1 capsule (40 mg total) by mouth once daily.     Begin Date    AM    Noon    PM    Bedtime       oxycodone-acetaminophen 5-325 mg per tablet   Commonly known as:  PERCOCET   Quantity:  40 tablet   Refills:  0   Dose:  1 tablet    Last time this was given:  1 tablet on 5/19/2017 10:45 AM   Instructions:  Take 1 tablet by mouth every 4 (four) hours as needed for Pain.     Begin Date    AM    Noon    PM    Bedtime       PRESERVISION AREDS ORAL   Refills:  0   Dose:  1 tablet    Instructions:  Take 1 tablet by mouth once daily.     Begin Date    AM    Noon    PM    Bedtime       rosuvastatin 10 MG tablet   Commonly known as:  CRESTOR   Quantity:  90 tablet   Refills:  3   Dose:  10 mg    Instructions:  Take 1 tablet (10 mg total) by mouth once daily.     Begin Date    AM    Noon    PM    Bedtime                  Please bring to all follow up appointments:    1. A copy of your discharge instructions.  2. All medicines you are currently taking in their original bottles.  3. Identification and insurance card.    Please arrive 15 minutes ahead of scheduled appointment time.    Please call 24 hours in advance if you must reschedule your appointment and/or time.        Your Scheduled Appointments     Jun 05, 2017 11:00 AM CDT   Post OP with Kuldip Morejon MD   [facility] ([facility])    [address] [place] [id number] [phone]            Oct 05, 2017 12:40 PM CDT   Established Patient Visit with Anselmo Devine MD   [facility] ([facility])    [address] [place] [address] E  [facility] [id number] [phone]              Follow-up Information     Follow up with Kuldip Morejon MD In 2  weeks.    Specialties:  Sports Medicine, Orthopedic Surgery    Contact information:    [address] [place] [address] [address] [place] [address]  [phone]          Discharge Instructions     Future Orders    Call MD for:  difficulty breathing, headache or visual disturbances     Call MD for:  extreme fatigue     Call MD for:  hives     Call MD for:  persistent dizziness or light-headedness     Call MD for:  persistent nausea and vomiting     Call MD for:  redness, tenderness, or signs of infection (pain, swelling, redness, odor or green/yellow discharge around incision site)     Call MD for:  severe uncontrolled pain     Call MD for:  temperature >100.4     Change dressing (specify)     Comments:    Dressing change: 1 times per day using waterproof bandaids.    Diet general     Questions:    Total calories:      Fat restriction, if any:      Protein restriction, if any:      Na restriction, if any:      Fluid restriction:      Additional restrictions:      Lifting restrictions     No driving, operating heavy equipment or signing legal documents while taking pain medication     Remove dressing in 48 hours         Discharge Instructions       1.Diet: Ice chips, clear liquids, and then diet as tolerated. Drink plenty of liquids.  2.Ice the area at least three times a day (20 minutes per session).  3.Elevate the extremity above the level of the heart to help reduce swelling.  4.Pain medication can be taken every four to six hours as needed. It is helpful to take pain medication prior to physical therapy.  5.Any activity that requires precise thinking or accuracy should be avoided for a minimum of 72 hours after surgery and while on narcotic pain medication. This includes operating machinery and/or driving a vehicle.  6.All sutures/staples will be removed approximately 14 days from the time of surgery. Leave steri-strips (skin tapes) in place until sutures are removed.  7. If skin glue is used instead of stitches, do not apply  "ointments or solutions to the incision. Keep the incision dry. The skin glue will peel off in 3-4 weeks.  8. Change dressing on the 2nd post-op day. Use gauze for the first 3 days, then start using Band-Aids over the incision sites.   9. All casts, splints, braces, slings, crutches, abduction pillows, etc... Are to be worn as instructed. Use sling at all times except for showering and exercises.  10. Keep the incision dry for 10-14 days. A waterproof dressing (purchase at [employer], [employer], etc) can be used to shower. No bath, pool, hot tub until instructed.  11. Start PT in 14 days. Call office to help with scheduling.  12. Call [phone] with any questions or concerns.    Discharge Instructions: After Your Surgery/Procedure  Youve just had surgery. During surgery you were given medicine called anesthesia to keep you relaxed and free of pain. After surgery you may have some pain or nausea. This is common. Here are some tips for feeling better and getting well after surgery.     Stay on schedule with your medication.   Going home  Your doctor or nurse will show you how to take care of yourself when you go home. He or she will also answer your questions. Have an adult family member or friend drive you home.      For your safety we recommend these precaution for the first 24 hours after your procedure:  · Do not drive or use heavy equipment.  · Do not make important decisions or sign legal papers.  · Do not drink alcohol.  · Have someone stay with you, if needed. He or she can watch for problems and help keep you safe.  · Your concentration, balance, coordination, and judgement may be impaired for many hours after anesthesia.  Use caution when ambulating or standing up.     · You may feel weak and "washed out" after anesthesia and surgery.      Subtle residual effects of general anesthesia or sedation with regional / local anesthesia can last more than 24 hours.  Rest for the remainder of the day or longer if your " Doctor/Surgeon has advised you to do so.  Although you may feel normal within the first 24 hours, your reflexes and mental ability may be impaired without you realizing it.  You may feel dizzy, lightheaded or sleepy for 24 hours or longer.      Be sure to go to all follow-up visits with your doctor. And rest after your surgery for as long as your doctor tells you to.  Coping with pain  If you have pain after surgery, pain medicine will help you feel better. Take it as told, before pain becomes severe. Also, ask your doctor or pharmacist about other ways to control pain. This might be with heat, ice, or relaxation. And follow any other instructions your surgeon or nurse gives you.  Tips for taking pain medicine  To get the best relief possible, remember these points:  · Pain medicines can upset your stomach. Taking them with a little food may help.  · Most pain relievers taken by mouth need at least 20 to 30 minutes to start to work.  · Taking medicine on a schedule can help you remember to take it. Try to time your medicine so that you can take it before starting an activity. This might be before you get dressed, go for a walk, or sit down for dinner.  · Constipation is a common side effect of pain medicines. Call your doctor before taking any medicines such as laxatives or stool softeners to help ease constipation. Also ask if you should skip any foods. Drinking lots of fluids and eating foods such as fruits and vegetables that are high in fiber can also help. Remember, do not take laxatives unless your surgeon has prescribed them.  · Drinking alcohol and taking pain medicine can cause dizziness and slow your breathing. It can even be deadly. Do not drink alcohol while taking pain medicine.  · Pain medicine can make you react more slowly to things. Do not drive or run machinery while taking pain medicine.  Your health care provider may tell you to take acetaminophen to help ease your pain. Ask him or her how much  you are supposed to take each day. Acetaminophen or other pain relievers may interact with your prescription medicines or other over-the-counter (OTC) drugs. Some prescription medicines have acetaminophen and other ingredients. Using both prescription and OTC acetaminophen for pain can cause you to overdose. Read the labels on your OTC medicines with care. This will help you to clearly know the list of ingredients, how much to take, and any warnings. It may also help you not take too much acetaminophen. If you have questions or do not understand the information, ask your pharmacist or health care provider to explain it to you before you take the OTC medicine.  Managing nausea  Some people have an upset stomach after surgery. This is often because of anesthesia, pain, or pain medicine, or the stress of surgery. These tips will help you handle nausea and eat healthy foods as you get better. If you were on a special food plan before surgery, ask your doctor if you should follow it while you get better. These tips may help:  · Do not push yourself to eat. Your body will tell you when to eat and how much.  · Start off with clear liquids and soup. They are easier to digest.  · Next try semi-solid foods, such as mashed potatoes, applesauce, and gelatin, as you feel ready.  · Slowly move to solid foods. Dont eat fatty, rich, or spicy foods at first.  · Do not force yourself to have 3 large meals a day. Instead eat smaller amounts more often.  · Take pain medicines with a small amount of solid food, such as crackers or toast, to avoid nausea.     Call your surgeon if  · You still have pain an hour after taking medicine. The medicine may not be strong enough.  · You feel too sleepy, dizzy, or groggy. The medicine may be too strong.  · You have side effects like nausea, vomiting, or skin changes, such as rash, itching, or hives.       If you have obstructive sleep apnea  You were given anesthesia medicine during surgery to  keep you comfortable and free of pain. After surgery, you may have more apnea spells because of this medicine and other medicines you were given. The spells may last longer than usual.   At home:  · Keep using the continuous positive airway pressure (CPAP) device when you sleep. Unless your health care provider tells you not to, use it when you sleep, day or night. CPAP is a common device used to treat obstructive sleep apnea.  · Talk with your provider before taking any pain medicine, muscle relaxants, or sedatives. Your provider will tell you about the possible dangers of taking these medicines.  © 5331-1876 [employer]. [address] [place] [address], [place] [address]. All rights reserved. This information is not intended as a substitute for professional medical care. Always follow your healthcare professional's instructions.      Using Opioids for Pain Management     Your doctor has given instructions for you to take an opioid.  This is a drug for bad pain.  It helps control pain without causing bleeding and kidney problems.  Common opioid names are morphine, hydromorphone, oxycodone, and methadone. These drugs are called narcotics.    There are several safety concerns you need to know.     · It is against the law to give or sell this drug to another person.  You must keep this medicine safely locked.    · You may have side effects from taking this medication.  These include nausea, itching, sweating, sleepiness, a change in your ability to breathe, and depression.  · Do not take alcohol or sleeping pills opioids.    · Long-term opoid use may no longer giver you relief from pain.  It can cause you stomach pain, mental anxiety, and headaches.  Long-term opoid use can potentially lead to unlawful street drug abuse and reduce your ability to stay employed.    · Your body may become opioid tolerant if you need to take more to get relief.    · You must stop taking opioids if you begin having more pain as a  result of the medicine.    · Opioid withdrawal occurs when you have to stop taking the drug.  It can cause you to have nausea, vomiting, diarrhea, stomach pain, anxiety, and dilated pupils in your eyes. This condition means you are opioid dependent.    · Addiction is a drug induced brain disease. It means there are changes in how your brain is working.  Children, teens, and young adults under 25 years old are more likely to get addicted to opioids.      · Addiction can happen with repeated opioid use.  It does not happen with short-term use of two weeks or less.       For more information, please speak with your doctor or pharmacy    We hope your stay was comfortable as you heal now, mend and rest.    For we have enjoyed taking care of you by giving your our best.    And as you get better, by regaining your health and strength;   We count it as a privilege to have served you and hope your time at [facility] was well spent.      Thank  You!!!      Primary Diagnosis     Your primary diagnosis was:  Calcium Deposits In Tendon      Admission Information     Date & Time Provider Department CSN    5/19/2017  8:11 AM Kuldip Morejon MD [facility] [id number]      Care Providers     Provider Role Specialty Primary office phone    Kuldip Morejon MD Attending Provider Sports Medicine [phone]    Kuldip Morejon MD Surgeon  Sports Medicine [phone]      Your Vitals Were     BP Pulse Temp Resp Height Weight    164/77 86 99.1 °F (37.3 °C) (Temporal) 12 6' (1.829 m) 114.8 kg (253 lb)    SpO2 BMI             96% 34.31 kg/m2         Recent Lab Values        9/13/2016                          11:38 AM           A1C 5.0           Comment for A1C at 11:38 AM on 9/13/2016:  According to ADA guidelines, hemoglobin A1C <7.0% represents  optimal control in non-pregnant diabetic patients.  Different  metrics may apply to specific populations.   Standards of Medical Care in Diabetes - 2016.  For  the purpose of screening for the presence of diabetes:  <5.7%     Consistent with the absence of diabetes  5.7-6.4%  Consistent with increasing risk for diabetes   (prediabetes)  >or=6.5%  Consistent with diabetes  Currently no consensus exists for use of hemoglobin A1C  for diagnosis of diabetes for children.        Allergies as of 5/19/2017        Reactions    Adhesive     Blistering, tolerates tegaderm    Valdecoxib Rash      Advance Directives     An advance directive is a document which, in the event you are no longer able to make decisions for yourself, tells your healthcare team what kind of treatment you do or do not want to receive, or who you would like to make those decisions for you.  If you do not currently have an advance directive, [facility] encourages you to create one.  For more information call:  [phone]), [phone] ([phone]),  or log on to [email or web address].        Language Assistance Services     ATTENTION: Language assistance services are available, free of charge. Please call [phone].      ATENCIÓN: Si habla español, tiene a wong disposición servicios gratuitos de asistencia lingüística. Llame al [phone].     CHÚ Ý: N?u b?n nói Ti?ng Vi?t, có các d?ch v? h? tr? ngôn ng? mi?n phí dành cho b?n. G?i s? [phone].        [facility] Sign-Up     Activating your [facility] account is as easy as 1-2-3!     1) Visit [email or web address], select Sign Up Now, enter this activation code and your date of birth, then select Next.  [email or web address]  Expires: 7/3/2017 10:31 AM      2) Create a username and password to use when you visit [facility] in the future and select a security question in case you lose your password and select Next.    3) Enter your e-mail address and click Sign Up!    Additional Information  If you have questions, please e-mail [email or web address] or call [phone] to talk to our [facility] staff. Remember, [facility] is NOT to be used for  urgent needs. For medical emergencies, dial 911.          [facility] complies with applicable Federal civil rights laws and does not discriminate on the basis of race, color, national origin, age, disability, or sex.

## 2017-05-19 NOTE — INTERVAL H&P NOTE
The patient has been examined and the H&P has been reviewed:    I concur with the findings and no changes have occurred since H&P was written.    Anesthesia/Surgery risks, benefits and alternative options discussed and understood by patient/family.          Active Hospital Problems    Diagnosis  POA    Calcific tendinitis of right shoulder [M75.31]  Yes      Resolved Hospital Problems    Diagnosis Date Resolved POA   No resolved problems to display.

## 2017-05-19 NOTE — DISCHARGE SUMMARY
[facility]  Discharge Note  Short Stay    Admit Date: 5/19/2017    Discharge Date and Time: 5/19/2017    Attending Physician: Kuldip Morejon MD     Discharge Provider: Kuldip Morejon    Diagnoses:  Active Hospital Problems    Diagnosis  POA    *Calcific tendinitis of right shoulder [M75.31]  Yes      Resolved Hospital Problems    Diagnosis Date Resolved POA   No resolved problems to display.       Discharged Condition: good    Hospital Course: Patient was admitted for an outpatient procedure and tolerated the procedure well with no complications.    Final Diagnoses: Same as principal problem.    Disposition: Home or Self Care    Follow up/Patient Instructions:    Medications:  Reconciled Home Medications:   Current Discharge Medication List      CONTINUE these medications which have NOT CHANGED    Details   albuterol (PROAIR HFA) 90 mcg/actuation inhaler Inhale 2 puffs into the lungs every 4 (four) hours as needed for Wheezing.  Qty: 18 g, Refills: 6    Associated Diagnoses: Asthma exacerbation, unspecified asthma severity      dicyclomine (BENTYL) 10 MG capsule TAKE 1 CAPSULE BY MOUTH NIGHTLY  Qty: 90 capsule, Refills: 0      diphenhydrAMINE (BENADRYL) 25 mg capsule Take 25 mg by mouth every 6 (six) hours as needed for Itching.      escitalopram oxalate (LEXAPRO) 20 MG tablet Take 1 tablet (20 mg total) by mouth once daily.  Qty: 90 tablet, Refills: 3    Associated Diagnoses: Depression, unspecified depression type      etodolac (LODINE) 400 MG tablet Take 1 tablet (400 mg total) by mouth 2 (two) times daily.  Qty: 60 tablet, Refills: 2      fluticasone (FLONASE) 50 mcg/actuation nasal spray 2 sprays by Each Nare route once daily. At first sign of sore throat, increase to twice daily for a few days. Once improved, return to maintenance dose of once daily  Qty: 48 g, Refills: 3    Associated Diagnoses: Allergic rhinitis, unspecified allergic rhinitis type      gabapentin (NEURONTIN)  100 MG capsule TAKE TWO CAPSULES BY MOUTH THREE TIMES DAILY  Qty: 180 capsule, Refills: 0      hydrocortisone (ANUSOL-HC) 25 mg suppository Place 1 suppository (25 mg total) rectally nightly.  Qty: 12 suppository, Refills: 10      ibuprofen (ADVIL,MOTRIN) 200 MG tablet Take 200 mg by mouth every 6 (six) hours as needed for Pain.      loratadine (CLARITIN) 10 mg tablet Take 10 mg by mouth daily as needed.       omeprazole (PRILOSEC) 40 MG capsule Take 1 capsule (40 mg total) by mouth once daily.  Qty: 90 capsule, Refills: 3      oxycodone-acetaminophen (PERCOCET) 5-325 mg per tablet Take 1 tablet by mouth every 4 (four) hours as needed for Pain.  Qty: 40 tablet, Refills: 0      rosuvastatin (CRESTOR) 10 MG tablet Take 1 tablet (10 mg total) by mouth once daily.  Qty: 90 tablet, Refills: 3    Associated Diagnoses: Hyperlipidemia, unspecified hyperlipidemia type      tizanidine (ZANAFLEX) 4 MG tablet TAKE ONE TABLET BY MOUTH ONCE DAILY AFTER  DINNER  Qty: 30 tablet, Refills: 0      VIT A/VIT C/VIT E/ZINC/COPPER (PRESERVISION AREDS ORAL) Take 1 tablet by mouth once daily.       ciprofloxacin HCl (CILOXAN) 0.3 % ophthalmic solution 5 drops to left EAR twice daily X 10 days  Qty: 10 mL, Refills: 0    Associated Diagnoses: Chronic diffuse otitis externa of left ear      hydrocortisone 2.5 % cream Thin layer to AA face BID for 2 weeks or less as needed for flare  Qty: 28 g, Refills: 2    Associated Diagnoses: Seborrheic dermatitis      ketoconazole (NIZORAL) 2 % shampoo Wash hair with medicated shampoo at least 2x/week - let sit on scalp at least 5 minutes prior to rinsing  Qty: 120 mL, Refills: 5    Associated Diagnoses: Seborrheic dermatitis      MULTIVITAMIN W-MINERALS/LUTEIN (MULTIVITAMIN-MINERALS-LUTEIN ORAL) Take 1 tablet by mouth once daily.      mupirocin (BACTROBAN) 2 % ointment              Discharge Procedure Orders  Diet general     Ice to affected area     Lifting restrictions     No driving, operating heavy  equipment or signing legal documents while taking pain medication     Call MD for:  temperature >100.4     Call MD for:  persistent nausea and vomiting     Call MD for:  severe uncontrolled pain     Call MD for:  difficulty breathing, headache or visual disturbances     Call MD for:  redness, tenderness, or signs of infection (pain, swelling, redness, odor or green/yellow discharge around incision site)     Call MD for:  hives     Call MD for:  persistent dizziness or light-headedness     Call MD for:  extreme fatigue     Remove dressing in 48 hours     Change dressing (specify)   Order Comments: Dressing change: 1 times per day using waterproof bandaids.       Follow-up Information     Follow up with Kuldip Morejon MD In 2 weeks.    Specialties:  Sports Medicine, Orthopedic Surgery    Contact information:    [address] [place] [address] [address] [place] [address] [phone]            Discharge Procedure Orders (must include Diet, Follow-up, Activity):    Discharge Procedure Orders (must include Diet, Follow-up, Activity)  Diet general     Ice to affected area     Lifting restrictions     No driving, operating heavy equipment or signing legal documents while taking pain medication     Call MD for:  temperature >100.4     Call MD for:  persistent nausea and vomiting     Call MD for:  severe uncontrolled pain     Call MD for:  difficulty breathing, headache or visual disturbances     Call MD for:  redness, tenderness, or signs of infection (pain, swelling, redness, odor or green/yellow discharge around incision site)     Call MD for:  hives     Call MD for:  persistent dizziness or light-headedness     Call MD for:  extreme fatigue     Remove dressing in 48 hours     Change dressing (specify)   Order Comments: Dressing change: 1 times per day using waterproof bandaids.

## 2017-05-19 NOTE — PLAN OF CARE
Vss, andres po fluids, denies pain, ambulates easily.  Right shoulder dressing clean dry intact, sling on, ice pack in place.  States ready to go home.  Discharged from facility with family.

## 2017-05-19 NOTE — PLAN OF CARE
Pt arrived to preop and placed in bed. SCDs and warm blankets applied. Periop process explained to both patient and spouse with verbalized understanding. Pts belongings tagged/bagged and kept in preop. Pts spouse kept pts hearing aids, glasses, wallet, passport, and pain meds.

## 2017-05-19 NOTE — TRANSFER OF CARE
Anesthesia Transfer of Care Note    Patient: Jorge Gipson    Procedure(s) Performed: Procedure(s) (LRB):  ARTHROSCOPY-SHOULDER WITH SUBACROMIAL DECOMPRESSION (Right)  [id number] END (Right)    Patient location: PACU    Anesthesia Type: general    Transport from OR: Transported from OR on 2-3 L/min O2 by NC with adequate spontaneous ventilation    Post pain: pain needs to be addressed (post op block per md)    Post assessment: no apparent anesthetic complications and tolerated procedure well    Post vital signs: stable    Level of consciousness: awake, alert and oriented    Nausea/Vomiting: no nausea/vomiting    Complications: none          Last vitals:   Visit Vitals    BP (!) 140/88 (BP Location: Left arm, Patient Position: Lying, BP Method: Automatic)    Pulse 77    Temp 36.8 °C (98.3 °F) (Oral)    Resp 18    Ht 6' (1.829 m)    Wt 114.8 kg (253 lb)    SpO2 97%    BMI 34.31 kg/m2

## 2017-05-19 NOTE — ANESTHESIA PROCEDURE NOTES
Peripheral    Patient location during procedure: pre-op   Block not for primary anesthetic.  Reason for block: at surgeon's request and post-op pain management   Post-op Pain Location: right shoulder   Start time: 5/19/2017 10:45 AM  Timeout: 5/19/2017 10:45 AM   End time: 5/19/2017 10:55 AM  Staffing  Anesthesiologist: KATRINA AMADOR  Performed by: anesthesiologist   Preanesthetic Checklist  Completed: patient identified, site marked, surgical consent, pre-op evaluation, timeout performed, IV checked, risks and benefits discussed and monitors and equipment checked  Peripheral Block  Patient position: sitting  Prep: ChloraPrep  Patient monitoring: heart rate, cardiac monitor, continuous pulse ox, continuous capnometry and frequent blood pressure checks  Block type: interscalene  Laterality: right  Injection technique: single shot  Needle  Needle type: Stimuplex   Needle gauge: 22 G  Needle length: 2 in  Needle localization: anatomical landmarks and ultrasound guidance   -ultrasound image captured on disc.  Assessment  Injection assessment: negative aspiration, negative parasthesia and local visualized surrounding nerve  Paresthesia pain: none  Heart rate change: no  Slow fractionated injection: yes  Medications:  Bolus administered: 30 mL of 0.5 ropivacaine  Epinephrine added: none  Additional Notes  VSS.  [facility] RN monitoring vitals throughout procedure.  Patient tolerated procedure well.

## 2017-05-19 NOTE — DISCHARGE INSTRUCTIONS
1.Diet: Ice chips, clear liquids, and then diet as tolerated. Drink plenty of liquids.  2.Ice the area at least three times a day (20 minutes per session).  3.Elevate the extremity above the level of the heart to help reduce swelling.  4.Pain medication can be taken every four to six hours as needed. It is helpful to take pain medication prior to physical therapy.  5.Any activity that requires precise thinking or accuracy should be avoided for a minimum of 72 hours after surgery and while on narcotic pain medication. This includes operating machinery and/or driving a vehicle.  6.All sutures/staples will be removed approximately 14 days from the time of surgery. Leave steri-strips (skin tapes) in place until sutures are removed.  7. If skin glue is used instead of stitches, do not apply ointments or solutions to the incision. Keep the incision dry. The skin glue will peel off in 3-4 weeks.  8. Change dressing on the 2nd post-op day. Use gauze for the first 3 days, then start using Band-Aids over the incision sites.   9. All casts, splints, braces, slings, crutches, abduction pillows, etc... Are to be worn as instructed. Use sling at all times except for showering and exercises.  10. Keep the incision dry for 10-14 days. A waterproof dressing (purchase at [employer], [employer], etc) can be used to shower. No bath, pool, hot tub until instructed.  11. Start PT in 14 days. Call office to help with scheduling.  12. Call [phone] with any questions or concerns.    Discharge Instructions: After Your Surgery/Procedure  Youve just had surgery. During surgery you were given medicine called anesthesia to keep you relaxed and free of pain. After surgery you may have some pain or nausea. This is common. Here are some tips for feeling better and getting well after surgery.     Stay on schedule with your medication.   Going home  Your doctor or nurse will show you how to take care of yourself when you go home. He or she will also answer  "your questions. Have an adult family member or friend drive you home.      For your safety we recommend these precaution for the first 24 hours after your procedure:  · Do not drive or use heavy equipment.  · Do not make important decisions or sign legal papers.  · Do not drink alcohol.  · Have someone stay with you, if needed. He or she can watch for problems and help keep you safe.  · Your concentration, balance, coordination, and judgement may be impaired for many hours after anesthesia.  Use caution when ambulating or standing up.     · You may feel weak and "washed out" after anesthesia and surgery.      Subtle residual effects of general anesthesia or sedation with regional / local anesthesia can last more than 24 hours.  Rest for the remainder of the day or longer if your Doctor/Surgeon has advised you to do so.  Although you may feel normal within the first 24 hours, your reflexes and mental ability may be impaired without you realizing it.  You may feel dizzy, lightheaded or sleepy for 24 hours or longer.      Be sure to go to all follow-up visits with your doctor. And rest after your surgery for as long as your doctor tells you to.  Coping with pain  If you have pain after surgery, pain medicine will help you feel better. Take it as told, before pain becomes severe. Also, ask your doctor or pharmacist about other ways to control pain. This might be with heat, ice, or relaxation. And follow any other instructions your surgeon or nurse gives you.  Tips for taking pain medicine  To get the best relief possible, remember these points:  · Pain medicines can upset your stomach. Taking them with a little food may help.  · Most pain relievers taken by mouth need at least 20 to 30 minutes to start to work.  · Taking medicine on a schedule can help you remember to take it. Try to time your medicine so that you can take it before starting an activity. This might be before you get dressed, go for a walk, or sit down " for dinner.  · Constipation is a common side effect of pain medicines. Call your doctor before taking any medicines such as laxatives or stool softeners to help ease constipation. Also ask if you should skip any foods. Drinking lots of fluids and eating foods such as fruits and vegetables that are high in fiber can also help. Remember, do not take laxatives unless your surgeon has prescribed them.  · Drinking alcohol and taking pain medicine can cause dizziness and slow your breathing. It can even be deadly. Do not drink alcohol while taking pain medicine.  · Pain medicine can make you react more slowly to things. Do not drive or run machinery while taking pain medicine.  Your health care provider may tell you to take acetaminophen to help ease your pain. Ask him or her how much you are supposed to take each day. Acetaminophen or other pain relievers may interact with your prescription medicines or other over-the-counter (OTC) drugs. Some prescription medicines have acetaminophen and other ingredients. Using both prescription and OTC acetaminophen for pain can cause you to overdose. Read the labels on your OTC medicines with care. This will help you to clearly know the list of ingredients, how much to take, and any warnings. It may also help you not take too much acetaminophen. If you have questions or do not understand the information, ask your pharmacist or health care provider to explain it to you before you take the OTC medicine.  Managing nausea  Some people have an upset stomach after surgery. This is often because of anesthesia, pain, or pain medicine, or the stress of surgery. These tips will help you handle nausea and eat healthy foods as you get better. If you were on a special food plan before surgery, ask your doctor if you should follow it while you get better. These tips may help:  · Do not push yourself to eat. Your body will tell you when to eat and how much.  · Start off with clear liquids and soup.  They are easier to digest.  · Next try semi-solid foods, such as mashed potatoes, applesauce, and gelatin, as you feel ready.  · Slowly move to solid foods. Dont eat fatty, rich, or spicy foods at first.  · Do not force yourself to have 3 large meals a day. Instead eat smaller amounts more often.  · Take pain medicines with a small amount of solid food, such as crackers or toast, to avoid nausea.     Call your surgeon if  · You still have pain an hour after taking medicine. The medicine may not be strong enough.  · You feel too sleepy, dizzy, or groggy. The medicine may be too strong.  · You have side effects like nausea, vomiting, or skin changes, such as rash, itching, or hives.       If you have obstructive sleep apnea  You were given anesthesia medicine during surgery to keep you comfortable and free of pain. After surgery, you may have more apnea spells because of this medicine and other medicines you were given. The spells may last longer than usual.   At home:  · Keep using the continuous positive airway pressure (CPAP) device when you sleep. Unless your health care provider tells you not to, use it when you sleep, day or night. CPAP is a common device used to treat obstructive sleep apnea.  · Talk with your provider before taking any pain medicine, muscle relaxants, or sedatives. Your provider will tell you about the possible dangers of taking these medicines.  © 0661-5196 [employer]. [address] [place] [address], [place] [address]. All rights reserved. This information is not intended as a substitute for professional medical care. Always follow your healthcare professional's instructions.      Using Opioids for Pain Management     Your doctor has given instructions for you to take an opioid.  This is a drug for bad pain.  It helps control pain without causing bleeding and kidney problems.  Common opioid names are morphine, hydromorphone, oxycodone, and methadone. These drugs are called  narcotics.    There are several safety concerns you need to know.     · It is against the law to give or sell this drug to another person.  You must keep this medicine safely locked.    · You may have side effects from taking this medication.  These include nausea, itching, sweating, sleepiness, a change in your ability to breathe, and depression.  · Do not take alcohol or sleeping pills opioids.    · Long-term opoid use may no longer giver you relief from pain.  It can cause you stomach pain, mental anxiety, and headaches.  Long-term opoid use can potentially lead to unlawful street drug abuse and reduce your ability to stay employed.    · Your body may become opioid tolerant if you need to take more to get relief.    · You must stop taking opioids if you begin having more pain as a result of the medicine.    · Opioid withdrawal occurs when you have to stop taking the drug.  It can cause you to have nausea, vomiting, diarrhea, stomach pain, anxiety, and dilated pupils in your eyes. This condition means you are opioid dependent.    · Addiction is a drug induced brain disease. It means there are changes in how your brain is working.  Children, teens, and young adults under 25 years old are more likely to get addicted to opioids.      · Addiction can happen with repeated opioid use.  It does not happen with short-term use of two weeks or less.       For more information, please speak with your doctor or pharmacy    We hope your stay was comfortable as you heal now, mend and rest.    For we have enjoyed taking care of you by giving your our best.    And as you get better, by regaining your health and strength;   We count it as a privilege to have served you and hope your time at [facility] was well spent.      Thank  You!!!

## 2017-05-23 RX ORDER — TIZANIDINE 4 MG/1
4 TABLET ORAL EVERY 8 HOURS
Qty: 90 TABLET | Refills: 0 | Status: SHIPPED | OUTPATIENT
Start: 2017-05-23 | End: 2017-06-02 | Stop reason: SDUPTHER

## 2017-05-23 RX ORDER — GABAPENTIN 100 MG/1
200 CAPSULE ORAL 3 TIMES DAILY
Qty: 540 CAPSULE | Refills: 0 | Status: SHIPPED | OUTPATIENT
Start: 2017-05-23 | End: 2017-09-07 | Stop reason: SDUPTHER

## 2017-05-23 RX ORDER — ALBUTEROL SULFATE 90 UG/1
2 AEROSOL, METERED RESPIRATORY (INHALATION) EVERY 4 HOURS PRN
Qty: 18 G | Refills: 0 | Status: SHIPPED | OUTPATIENT
Start: 2017-05-23 | End: 2018-01-18 | Stop reason: SDUPTHER

## 2017-05-23 NOTE — TELEPHONE ENCOUNTER
----- Message from Abiola Haynes sent at 5/23/2017  8:53 AM CDT -----  Contact: [phone]  Patient is requesting a call back from the nurse stated he need to change his medication to a 3 month supply and to get a med that helps him sleep.    Please call the patient upon request at phone number [phone].

## 2017-05-23 NOTE — TELEPHONE ENCOUNTER
Explained to patient that  is out ill today; RX's to be sent to covering provider for 90days. Verified how he is taking Gabapentin and Zanaflex. Takes Gabapentin 2 caps TID, Zanaflex TID. Explained that Zanaflex will be reviewed for 30day supply due to sedating properties and when he comes to see , they can discuss quantity change then. He states he understands.

## 2017-05-24 ENCOUNTER — TELEPHONE (OUTPATIENT)
Dept: ORTHOPEDICS | Facility: CLINIC | Age: 67
End: 2017-05-24

## 2017-05-24 RX ORDER — OXYCODONE AND ACETAMINOPHEN 5; 325 MG/1; MG/1
1 TABLET ORAL EVERY 4 HOURS PRN
Qty: 40 TABLET | Refills: 0 | Status: SHIPPED | OUTPATIENT
Start: 2017-05-24 | End: 2017-05-30 | Stop reason: SDUPTHER

## 2017-05-24 NOTE — TELEPHONE ENCOUNTER
----- Message from Stacey M Lefort sent at 5/24/2017  8:12 AM CDT -----  Contact: Patient  Patient would like his Percocet pain medicine called in to his [facility] pharmacy. Thank you.

## 2017-05-29 ENCOUNTER — DOCUMENTATION ONLY (OUTPATIENT)
Dept: FAMILY MEDICINE | Facility: CLINIC | Age: 67
End: 2017-05-29

## 2017-05-29 NOTE — PROGRESS NOTES
Pre-Visit Chart Review  For Appointment Scheduled on 05/30/17    Health Maintenance Due   Topic Date Due    Abdominal Aortic Aneurysm Screening  01/29/2017

## 2017-05-30 ENCOUNTER — OFFICE VISIT (OUTPATIENT)
Dept: FAMILY MEDICINE | Facility: CLINIC | Age: 67
End: 2017-05-30
Payer: COMMERCIAL

## 2017-05-30 VITALS — HEIGHT: 72 IN | OXYGEN SATURATION: 94 % | BODY MASS INDEX: 35.03 KG/M2 | WEIGHT: 258.63 LBS | TEMPERATURE: 98 F

## 2017-05-30 DIAGNOSIS — E66.9 OBESITY (BMI 30-39.9): ICD-10-CM

## 2017-05-30 DIAGNOSIS — H60.312 CHRONIC DIFFUSE OTITIS EXTERNA OF LEFT EAR: Primary | ICD-10-CM

## 2017-05-30 DIAGNOSIS — J45.901 ASTHMA EXACERBATION: ICD-10-CM

## 2017-05-30 PROCEDURE — 99999 PR PBB SHADOW E&M-EST. PATIENT-LVL III: CPT | Mod: PBBFAC,,, | Performed by: PHYSICIAN ASSISTANT

## 2017-05-30 PROCEDURE — 99213 OFFICE O/P EST LOW 20 MIN: CPT | Mod: PBBFAC,PO | Performed by: PHYSICIAN ASSISTANT

## 2017-05-30 PROCEDURE — 99213 OFFICE O/P EST LOW 20 MIN: CPT | Mod: S$PBB,,, | Performed by: PHYSICIAN ASSISTANT

## 2017-05-30 RX ORDER — AZITHROMYCIN 250 MG/1
TABLET, FILM COATED ORAL
Qty: 6 TABLET | Refills: 0 | Status: SHIPPED | OUTPATIENT
Start: 2017-05-30 | End: 2017-06-04

## 2017-05-30 RX ORDER — OXYCODONE AND ACETAMINOPHEN 5; 325 MG/1; MG/1
1 TABLET ORAL EVERY 4 HOURS PRN
Qty: 40 TABLET | Refills: 0 | Status: SHIPPED | OUTPATIENT
Start: 2017-05-30 | End: 2017-06-05 | Stop reason: SDUPTHER

## 2017-05-30 RX ORDER — PREDNISONE 10 MG/1
10 TABLET ORAL 2 TIMES DAILY
Qty: 6 TABLET | Refills: 0 | Status: SHIPPED | OUTPATIENT
Start: 2017-05-30 | End: 2017-06-02

## 2017-05-30 RX ORDER — CIPROFLOXACIN HYDROCHLORIDE 3 MG/ML
SOLUTION/ DROPS OPHTHALMIC
Qty: 10 ML | Refills: 0 | Status: SHIPPED | OUTPATIENT
Start: 2017-05-30

## 2017-05-30 NOTE — PATIENT INSTRUCTIONS
Walking for Fitness  Fitness walking has something for everyone, even people who are already fit. Walking is one of the safest ways to condition your body aerobically. It can boost energy, help you lose weight, and reduce stress.    Physical benefits  · Walking strengthens your heart and lungs, and tones your muscles.  · When walking, your feet land with less impact than in other sports. This reduces chances of muscle, bone, and joint injury.  · Regular walking improves your cholesterol levels and lowers your risk of heart disease. And it helps you control your blood sugar if you have diabetes.  · Walking is a weight-bearing activity, which helps maintain bone density. This can help prevent osteoporosis.  Personal rewards  · Taking walks can help you relax and manage stress. And fitness walking may make you feel better about yourself.  · Walking can help you sleep better at night and make you less likely to be depressed.  · Regular walking may help maintain your memory as you get older.  · Walking is a great way to spend extra time with friends and family members. Be sure to invite your dog along!  Q&A about fitness walking  Q: Will walking keep me fit?  A: Yes. Regular walking at the right pace gives you all the benefits of other aerobic activities, such as jogging and swimming.  Q: Will walking help me lose weight and keep it off?  A: Yes. Per mile, walking can burn as many calories as jogging. Your health care provider can help work walking into your weight-loss plan.  Q: Is walking safe for my health?  A: Yes. Walking is safe if you have high blood pressure, diabetes, heart disease, or other conditions. Talk to your health care provider before you start.  Date Last Reviewed: 5/9/2015  © 5722-0923 [employer]. [address] [place] [address], [place] [address]. All rights reserved. This information is not intended as a substitute for professional medical care. Always follow your healthcare professional's  instructions.

## 2017-05-30 NOTE — TELEPHONE ENCOUNTER
----- Message from Stacey M Lefort sent at 5/30/2017  8:03 AM CDT -----  Patient would like you to call in his pain medicine to [facility] on [address]. If you have any questions he can be reached at [phone]. Thank you.

## 2017-05-30 NOTE — PROGRESS NOTES
Subjective:       Patient ID: Jorge Gipson is a 67 y.o. male.    Chief Complaint: Cough; Other (chest congestion); and Otalgia    Cough   This is a new problem. The current episode started in the past 7 days. The problem has been gradually worsening. The cough is productive of purulent sputum. Associated symptoms include ear congestion, ear pain, nasal congestion, postnasal drip, rhinorrhea and wheezing. Pertinent negatives include no chest pain, chills, fever, headaches, sore throat, shortness of breath, sweats or weight loss. Nothing aggravates the symptoms. He has tried a beta-agonist inhaler (guafenesin) for the symptoms. His past medical history is significant for bronchitis.     Review of Systems   Constitutional: Negative for chills, fever and weight loss.   HENT: Positive for ear pain, postnasal drip and rhinorrhea. Negative for sore throat.    Respiratory: Positive for cough and wheezing. Negative for shortness of breath.    Cardiovascular: Negative for chest pain.   Neurological: Negative for headaches.       Objective:      Physical Exam   Constitutional: Vital signs are normal. He appears well-developed and well-nourished. No distress.   HENT:   Head: Normocephalic and atraumatic.   Right Ear: External ear and ear canal normal. There is drainage and swelling. Tympanic membrane is scarred. A middle ear effusion is present. Decreased hearing is noted.   Left Ear: External ear and ear canal normal. No drainage or swelling. Tympanic membrane is scarred. Decreased hearing is noted.   Nose: Rhinorrhea present.   Mouth/Throat: Uvula is midline and oropharynx is clear and moist.   Cardiovascular: Normal rate, regular rhythm, S1 normal, S2 normal and normal heart sounds.  Exam reveals no gallop.    No murmur heard.  Pulses:       Radial pulses are 2+ on the right side, and 2+ on the left side.   <2sec cap refill fingers bilat     Pulmonary/Chest: Effort normal. No apnea. No respiratory distress. He has  decreased breath sounds in the right upper field and the left upper field. He has no wheezes. He has no rhonchi.   Lymphadenopathy:        Head (right side): No submental, no submandibular, no tonsillar, no preauricular, no posterior auricular and no occipital adenopathy present.        Head (left side): No submental, no submandibular, no tonsillar, no preauricular, no posterior auricular and no occipital adenopathy present.   Skin: Skin is warm and dry. He is not diaphoretic.   Appropriate skin turgor   Psychiatric: He has a normal mood and affect. His speech is normal and behavior is normal. Judgment and thought content normal. Cognition and memory are normal.       Assessment:       1. Chronic diffuse otitis externa of left ear    2. Asthma exacerbation    3. Obesity (BMI 30-39.9)        Plan:       Jorge was seen today for cough, other and otalgia.    Diagnoses and all orders for this visit:    Chronic diffuse otitis externa of left ear  -     ciprofloxacin HCl (CILOXAN) 0.3 % ophthalmic solution; 5 drops to left EAR twice daily X 10 days      Asthma exacerbation  -     azithromycin (Z-BRIDGETTE) 250 MG tablet; Take 2 tablets by mouth on day 1; Take 1 tablet by mouth on days 2-5  -     predniSONE (DELTASONE) 10 MG tablet; Take 1 tablet (10 mg total) by mouth 2 (two) times daily.  Return to clinic if symptoms worsen or do not improve with treatment    Obesity (BMI 30-39.9)  Patient readiness: acceptance and barriers:none    During the course of the visit the patient was educated and counseled about the following:     Obesity:   Regular aerobic exercise program discussed.    Goals: Obesity: Reduce calorie intake and BMI    Did patient meet goals/outcomes: No    The following self management tools provided: declined    Patient Instructions (the written plan) was given to the patient/family.     Time spent with patient: 15 minutes

## 2017-06-02 RX ORDER — TIZANIDINE 4 MG/1
TABLET ORAL
Qty: 90 TABLET | Refills: 0 | Status: SHIPPED | OUTPATIENT
Start: 2017-06-02 | End: 2017-07-03 | Stop reason: SDUPTHER

## 2017-06-05 ENCOUNTER — OFFICE VISIT (OUTPATIENT)
Dept: ORTHOPEDICS | Facility: CLINIC | Age: 67
End: 2017-06-05
Payer: COMMERCIAL

## 2017-06-05 VITALS
WEIGHT: 258 LBS | BODY MASS INDEX: 34.95 KG/M2 | SYSTOLIC BLOOD PRESSURE: 164 MMHG | HEART RATE: 105 BPM | DIASTOLIC BLOOD PRESSURE: 90 MMHG | HEIGHT: 72 IN

## 2017-06-05 DIAGNOSIS — M75.31 CALCIFIC TENDINITIS OF RIGHT SHOULDER: Primary | ICD-10-CM

## 2017-06-05 DIAGNOSIS — M25.511 RIGHT SHOULDER PAIN, UNSPECIFIED CHRONICITY: Primary | ICD-10-CM

## 2017-06-05 PROCEDURE — 99999 PR PBB SHADOW E&M-EST. PATIENT-LVL III: CPT | Mod: PBBFAC,,, | Performed by: ORTHOPAEDIC SURGERY

## 2017-06-05 PROCEDURE — 99024 POSTOP FOLLOW-UP VISIT: CPT | Mod: S$GLB,,, | Performed by: ORTHOPAEDIC SURGERY

## 2017-06-05 RX ORDER — OXYCODONE AND ACETAMINOPHEN 5; 325 MG/1; MG/1
1 TABLET ORAL EVERY 6 HOURS PRN
Qty: 40 TABLET | Refills: 0 | Status: SHIPPED | OUTPATIENT
Start: 2017-06-05 | End: 2017-06-14

## 2017-06-05 NOTE — PROGRESS NOTES
This note was created using Dragon dictation software.  It occasionally misinterpreted phrases or words.      Date of surgery: May 19, 2017    Chief complaint: Right shoulder pain    History of present illness: 67-year-old male who had a history of impingement and calcific tendinitis underwent subacromial decompression with acromioplasty and distal clavicle excision.  Patient's pain is 2 out of 10.  Up to a 7 out of 10 with range of motion.  Tender of the acromioclavicular joint    Physical exam: Examination of the right shoulder shows well-healing surgical portals.  No erythema or drainage.  Mild bruising and swelling.  Neurovascularly intact.    X-rays: None    Assessment: Status post right acromioplasty and distal clavicle excision    Plan: I reviewed the arthroscopic photos and the procedure with him today.  We took out his stitches.  I recommended physical therapy.  Follow-up in 4 weeks.

## 2017-06-13 ENCOUNTER — CLINICAL SUPPORT (OUTPATIENT)
Dept: REHABILITATION | Facility: HOSPITAL | Age: 67
End: 2017-06-13
Attending: ORTHOPAEDIC SURGERY
Payer: MEDICARE

## 2017-06-13 ENCOUNTER — TELEPHONE (OUTPATIENT)
Dept: ORTHOPEDICS | Facility: CLINIC | Age: 67
End: 2017-06-13

## 2017-06-13 DIAGNOSIS — R29.898 SHOULDER WEAKNESS: ICD-10-CM

## 2017-06-13 DIAGNOSIS — M25.611 SHOULDER STIFFNESS, RIGHT: ICD-10-CM

## 2017-06-13 DIAGNOSIS — M25.511 RIGHT SHOULDER PAIN, UNSPECIFIED CHRONICITY: Primary | ICD-10-CM

## 2017-06-13 DIAGNOSIS — M25.611 STIFFNESS OF RIGHT SHOULDER JOINT: ICD-10-CM

## 2017-06-13 PROCEDURE — 97165 OT EVAL LOW COMPLEX 30 MIN: CPT | Mod: PN

## 2017-06-13 PROCEDURE — G8987 SELF CARE CURRENT STATUS: HCPCS | Mod: CL,PN

## 2017-06-13 PROCEDURE — G8988 SELF CARE GOAL STATUS: HCPCS | Mod: CH,PN

## 2017-06-13 NOTE — PLAN OF CARE
TIME RECORD    Date: 06/13/2017    Start Time:  3  Stop Time:  330    PROCEDURES:    TIMED  Procedure Time Min.    Start:  Stop:     Start:  Stop:     Start:  Stop:     Start:  Stop:          UNTIMED  Procedure Time Min.   eval Start:3  Stop:330     Start:  Stop:      Total Timed Minutes:  0  Total Timed Units:  0  Total Untimed Units:  1  Charges Billed/# of units:  1    OUTPATIENT OCCUPATIONAL THERAPY   PATIENT EVALUATION  Onset Date: about 5 years ago  Primary Diagnosis: r subacromial decompression, distal clavicle resection  1. Right shoulder pain, unspecified chronicity     2. Shoulder stiffness, right     3. Shoulder weakness       Treatment Diagnosis: see above  Past Medical History:   Diagnosis Date    ALLERGIC RHINITIS     Allergy     Anticoagulant long-term use     ASA 81mg, Naprosyn    Anxiety     Arthritis     Asthma     3/3/2014-no problem in years    BPH (benign prostatic hypertrophy)     Bronchitis     Chronic upper back pain     Diverticulum     DJD (degenerative joint disease), lumbar     Fever blister     GERD (gastroesophageal reflux disease)     HEARING LOSS     wears hearing aids    [place] (hard of hearing)     bilat aids    Hyperlipidemia     IBS (irritable bowel syndrome)     Otitis media     Rash     Shoulder pain, bilateral     Sleep apnea     no C-pap    Sleep apnea     no machine    Wears glasses    r cuff repair  l cuff repair, l impingement surgery  Precautions: universal, protocol  Prior Therapy: none  Medications: Jorge Gipson has a current medication list which includes the following prescription(s): albuterol, ciprofloxacin hcl, dicyclomine, diphenhydramine, escitalopram oxalate, etodolac, fluticasone, gabapentin, hydrocortisone, ibuprofen, ketoconazole, loratadine, multivitamin with minerals/lut, mupirocin, omeprazole, oxycodone-acetaminophen, rosuvastatin, tizanidine, and vit a/vit c/vit e/zinc/copper.  Nutrition:  Overweight  History of Present Illness:  "insidious onset, had surgery, recently sent here  Prior Level of Function: Independent  Social History: pain meds prn, denies nicotine and caffeine  Functional Deficits Leading to Referral/Nature of Injury: decreased rom, use, and strength with ADL  Patient Therapy Goals: full painless use    Subjective     Jorge Gipson states understanding of HEP importance and general anticipated progression of therapy.    Pain:  c4 ache  Rest 0/10, use up to 8, sleep up to 8  Objective     Posture: radhames deferred  Palpation: nt  Sensation: denies burning, numbness, tingling  Range of Motion:    r prom er at 0 abd 60   at 45 abd 84    at 90 abd 80      slir 40          func ir 6" lift off      all capsular  l prom er at 0 abd 80    at 45 abd 80   at  90 abd 80     slir  80        func ir 14" lift off        ADL: decreased elevation, ir behind back, and HADD due to pain, stiffness, and weakness  Hand Dominance: r  Job: retired  Duties: Normal self and home care tasks  Treatment: issued proper ice use for pain, told to take pain meds if pain prevents sleep, issued er 0 abd and sleeper stretch; told to use r arm for light painless nonrepetitive use only, told to avoid overhead use; understands basic concept of tissue reorientation, collagen reorganization, etc. associated with low load prolonged stretching    Assessment       Initial Assessment (Pertinent finding, problem list and factors affecting outcome): Needs skilled OT to properly promote rom, use, and strength given type, nature, and extent of diagnosis  Rehab Potiential: excellent    stg 1. Pt. Will be I with HEP 2. Pt. Will have 2/10 pain with light use 3. Pt. Will have = prom of bilateral shoulders To enhance affected arm use with ADL      ltg 1. Pt. Will be I with d/c HEP 2. Pt. Will have 1/10 pain with all use 3. Pt. Will have roughly 3+/5 MMT for elevation, er to enhance use r ue with all ADL long term                                                                   "        4. Pt. Will be I with all ADL    Plan     Certification Period: 6-13-17 to 11-28-17  Recommended Treatment Plan: 3 times per week for 24 weeks:eval and tx  Other Recommendations: above visit frequency and duration in above dates may be adjusted based on pt. progress and need for therapy      Therapist: Carmelo Humphreys OT/CHT    I CERTIFY THE NEED FOR THESE SERVICES FURNISHED UNDER THIS PLAN OF TREATMENT AND WHILE UNDER MY CARE    Physician's comments: ________________________________________________________________________________________________________________________________________________      Physician's Name: ___________________________________

## 2017-06-13 NOTE — PROGRESS NOTES
Please sign poc    FIM  Current g code   CL mod a 60-80% impairment  Goal g code        CH independent 0% impairment

## 2017-06-13 NOTE — TELEPHONE ENCOUNTER
----- Message from Merlyn Haynes sent at 6/13/2017  8:01 AM CDT -----  Contact: SELF  Patient needs refills on pain meds

## 2017-06-14 RX ORDER — HYDROCODONE BITARTRATE AND ACETAMINOPHEN 10; 325 MG/1; MG/1
1 TABLET ORAL EVERY 4 HOURS PRN
Qty: 40 TABLET | Refills: 0 | Status: SHIPPED | OUTPATIENT
Start: 2017-06-14 | End: 2017-06-22 | Stop reason: SDUPTHER

## 2017-06-20 ENCOUNTER — DOCUMENTATION ONLY (OUTPATIENT)
Dept: FAMILY MEDICINE | Facility: CLINIC | Age: 67
End: 2017-06-20

## 2017-06-20 ENCOUNTER — OFFICE VISIT (OUTPATIENT)
Dept: FAMILY MEDICINE | Facility: CLINIC | Age: 67
End: 2017-06-20
Payer: MEDICARE

## 2017-06-20 VITALS
BODY MASS INDEX: 35.38 KG/M2 | HEART RATE: 76 BPM | TEMPERATURE: 98 F | WEIGHT: 261.25 LBS | HEIGHT: 72 IN | DIASTOLIC BLOOD PRESSURE: 72 MMHG | SYSTOLIC BLOOD PRESSURE: 113 MMHG | OXYGEN SATURATION: 95 %

## 2017-06-20 DIAGNOSIS — R05.9 COUGH: ICD-10-CM

## 2017-06-20 DIAGNOSIS — J32.9 SINUSITIS, UNSPECIFIED CHRONICITY, UNSPECIFIED LOCATION: Primary | ICD-10-CM

## 2017-06-20 PROCEDURE — 99213 OFFICE O/P EST LOW 20 MIN: CPT | Mod: S$PBB,,, | Performed by: FAMILY MEDICINE

## 2017-06-20 PROCEDURE — 99999 PR PBB SHADOW E&M-EST. PATIENT-LVL III: CPT | Mod: PBBFAC,,, | Performed by: FAMILY MEDICINE

## 2017-06-20 PROCEDURE — 99213 OFFICE O/P EST LOW 20 MIN: CPT | Mod: PBBFAC,PO | Performed by: FAMILY MEDICINE

## 2017-06-20 RX ORDER — CEFDINIR 300 MG/1
600 CAPSULE ORAL DAILY
COMMUNITY
Start: 2017-06-18 | End: 2017-07-21

## 2017-06-20 RX ORDER — BENZONATATE 100 MG/1
200 CAPSULE ORAL 3 TIMES DAILY PRN
COMMUNITY
Start: 2017-06-18 | End: 2017-07-21

## 2017-06-20 RX ORDER — AMOXICILLIN AND CLAVULANATE POTASSIUM 875; 125 MG/1; MG/1
1 TABLET, FILM COATED ORAL 2 TIMES DAILY
Qty: 20 TABLET | Refills: 0 | Status: SHIPPED | OUTPATIENT
Start: 2017-06-20 | End: 2017-06-30

## 2017-06-20 RX ORDER — CODEINE PHOSPHATE AND GUAIFENESIN 10; 100 MG/5ML; MG/5ML
5 SOLUTION ORAL 3 TIMES DAILY PRN
Qty: 118 ML | Refills: 0 | Status: SHIPPED | OUTPATIENT
Start: 2017-06-20 | End: 2017-06-30

## 2017-06-20 NOTE — PATIENT INSTRUCTIONS
Weight Management: Getting Started  Healthy bodies come in all shapes and sizes. Not all bodies are made to be thin. For some people, a healthy weight is higher than the average weight listed on weight charts. Your healthcare provider can help you decide on a healthy weight for you.    Reasons to lose weight  Losing weight can help with some health problems, such as high blood pressure, heart disease, diabetes, sleep apnea, and arthritis. You may also feel more energy.  Set your long-term goal  Your goal doesn't even have to be a specific weight. You may decide on a fitness goal (such as being able to walk 10 miles a week), or a health goal (such as lowering your blood pressure). Choose a goal that is measurable and reasonable, so you know when you've reached it. A goal of reaching a BMI of less than 25 is not always reasonable (or possible).   Make an action plan  Habits dont change overnight. Setting your goals too high can leave you feeling discouraged if you cant reach them. Be realistic. Choose one or two small changes you can make now. Set an action plan for how you are going to make these changes. When you can stick to this plan, keep making a few more small changes. Taking small steps will help you stay on the path to success.  Track your progress  Write down your goals. Then, keep a daily record of your progress. Write down what you eat and how active you are. This record lets you look back on how much youve done. It may also help when youre feeling frustrated. Reward yourself for success. Even if you dont reach every goal, give yourself credit for what you do get done.  Get support  Encouragement from others can help make losing weight easier. Ask your family members and friends for support. They may even want to join you. Also look to your healthcare provider, registered dietitian, and  for help. Your local hospital can give you more information about nutrition, exercise, and  weight loss.  Date Last Reviewed: 1/31/2016 © 2000-2016 [employer]. [address] [place] [address], [place] [address]. All rights reserved. This information is not intended as a substitute for professional medical care. Always follow your healthcare professional's instructions.        Walking for Fitness  Fitness walking has something for everyone, even people who are already fit. Walking is one of the safest ways to condition your body aerobically. It can boost energy, help you lose weight, and reduce stress.    Physical benefits  · Walking strengthens your heart and lungs, and tones your muscles.  · When walking, your feet land with less impact than in other sports. This reduces chances of muscle, bone, and joint injury.  · Regular walking improves your cholesterol levels and lowers your risk of heart disease. And it helps you control your blood sugar if you have diabetes.  · Walking is a weight-bearing activity, which helps maintain bone density. This can help prevent osteoporosis.  Personal rewards  · Taking walks can help you relax and manage stress. And fitness walking may make you feel better about yourself.  · Walking can help you sleep better at night and make you less likely to be depressed.  · Regular walking may help maintain your memory as you get older.  · Walking is a great way to spend extra time with friends and family members. Be sure to invite your dog along!  Q&A about fitness walking  Q: Will walking keep me fit?  A: Yes. Regular walking at the right pace gives you all the benefits of other aerobic activities, such as jogging and swimming.  Q: Will walking help me lose weight and keep it off?  A: Yes. Per mile, walking can burn as many calories as jogging. Your health care provider can help work walking into your weight-loss plan.  Q: Is walking safe for my health?  A: Yes. Walking is safe if you have high blood pressure, diabetes, heart disease, or other conditions. Talk to your health  care provider before you start.  Date Last Reviewed: 5/9/2015  © 6848-6867 [employer]. [address] [place] [address], [place] [address]. All rights reserved. This information is not intended as a substitute for professional medical care. Always follow your healthcare professional's instructions.

## 2017-06-20 NOTE — PROGRESS NOTES
Pre-Visit Chart Review  For Appointment Scheduled on 06/20/2017    Health Maintenance Due   Topic Date Due    Abdominal Aortic Aneurysm Screening  01/29/2017

## 2017-06-20 NOTE — PROGRESS NOTES
[facility]  Progress Note    Subjective:       Patient ID: Jorge Gipson is a 67 y.o. male.    Chief Complaint: Cough and Sore Throat    HPI67 y.o.male is here today complaining of sinus congestion, sinus pressure, positive drip, rhinorrhea, sneezing, cough, and sore throat.  Patient was seen in urgent care and was given a steroid shot antibiotics.  Patient has been compliant with medications.  Medications have not been effective in treating his symptoms.  Patient has had positive sick contacts.  Patient denies fever or chills.  No further complaints at today's visit.  Review of Systems   Constitutional: Negative for chills and fever.   HENT: Positive for congestion, postnasal drip, rhinorrhea, sinus pressure and sneezing.    Eyes: Negative for pain.   Respiratory: Positive for cough. Negative for shortness of breath and wheezing.    Cardiovascular: Negative for chest pain, palpitations and leg swelling.   Gastrointestinal: Negative for abdominal pain, nausea and vomiting.   Genitourinary: Negative for difficulty urinating.   Musculoskeletal: Negative for arthralgias, gait problem and myalgias.   Skin: Negative for rash.   Neurological: Negative for seizures.       Objective:      Vitals:    06/20/17 0911   BP: 113/72   BP Location: Left arm   Patient Position: Sitting   Pulse: 76   Temp: 98 °F (36.7 °C)   TempSrc: Oral   SpO2: 95%   Weight: 118.5 kg (261 lb 3.9 oz)   Height: 6' (1.829 m)     Body mass index is 35.43 kg/m².  Physical Exam   Constitutional: He is oriented to person, place, and time. He appears well-developed and well-nourished. No distress.   HENT:   Head: Normocephalic and atraumatic.   Maxillary sinuses tender to palpitation   Cardiovascular: Normal rate, regular rhythm, normal heart sounds and intact distal pulses.  Exam reveals no gallop and no friction rub.    No murmur heard.  Pulmonary/Chest: Effort normal and breath sounds normal. No respiratory distress. He has no rales.    Abdominal: Soft. He exhibits no distension and no mass. There is no rebound and no guarding. No hernia.   Musculoskeletal: Normal range of motion.   Neurological: He is alert and oriented to person, place, and time.   Skin: Skin is warm.   Psychiatric: He has a normal mood and affect. His behavior is normal. Judgment and thought content normal.   Vitals reviewed.      Assessment:       1. Sinusitis, unspecified chronicity, unspecified location    2. Cough        Plan:       Sinusitis, unspecified chronicity, unspecified location  -     amoxicillin-clavulanate 875-125mg (AUGMENTIN) 875-125 mg per tablet; Take 1 tablet by mouth 2 (two) times daily.  Dispense: 20 tablet; Refill: 0    Cough  -     guaifenesin-codeine 100-10 mg/5 ml (TUSSI-ORGANIDIN NR)  mg/5 mL syrup; Take 5 mLs by mouth 3 (three) times daily as needed for Cough.  Dispense: 118 mL; Refill: 0      Return if symptoms worsen or fail to improve.  Anselmo Devine MD  [facility]  6/20/2017 10:20 AM

## 2017-06-22 RX ORDER — HYDROCODONE BITARTRATE AND ACETAMINOPHEN 10; 325 MG/1; MG/1
1 TABLET ORAL EVERY 4 HOURS PRN
Qty: 40 TABLET | Refills: 0 | Status: SHIPPED | OUTPATIENT
Start: 2017-06-22 | End: 2017-06-29 | Stop reason: SDUPTHER

## 2017-06-22 NOTE — TELEPHONE ENCOUNTER
----- Message from Stacey M Lefort sent at 6/22/2017  8:03 AM CDT -----  Patient needs a refill of his pain medicine called in. Thank you.

## 2017-06-26 ENCOUNTER — CLINICAL SUPPORT (OUTPATIENT)
Dept: REHABILITATION | Facility: HOSPITAL | Age: 67
End: 2017-06-26
Attending: ORTHOPAEDIC SURGERY
Payer: MEDICARE

## 2017-06-26 DIAGNOSIS — R29.898 SHOULDER WEAKNESS: ICD-10-CM

## 2017-06-26 DIAGNOSIS — M25.611 SHOULDER STIFFNESS, RIGHT: ICD-10-CM

## 2017-06-26 DIAGNOSIS — M25.511 RIGHT SHOULDER PAIN, UNSPECIFIED CHRONICITY: Primary | ICD-10-CM

## 2017-06-26 PROCEDURE — 97110 THERAPEUTIC EXERCISES: CPT | Mod: PN

## 2017-06-26 PROCEDURE — 97140 MANUAL THERAPY 1/> REGIONS: CPT | Mod: PN

## 2017-06-26 NOTE — PROGRESS NOTES
"Time in 850  Time out 950      Timed units  2 manual                              Time:850-920  2 therex                              Time:920-950      S:says has to take pain pill after HEP  Pain:no change since last visit    O:    r prom er at 0 abd 70   at 45 abd 70    at 90 abd 90      slir 46         func ir 14" lift off           all capsular  l prom er at 0 abd 80    at 45 abd 80   at  90 abd 80     slir 80         func ir 14" lift off    HEP: reviewed, explained to go to 2/10 only with HEP and that pain meds should ideally only be used if pain prevents sleep    manual tx:     gh resting position traction, prom circumduction, bursal likemassage abd 1 and 2, axial traction 90 abd  abd pain relief gh resting position traction, slide; scapulothoracic tx and subscapularis stretch not done; warm up rhythmic inf slides, abd with inf slides    prom as tolerated-pain free: n/a    stretches as tolerated-pain free: er 0 abd, sleeper    theraband 2 x 20:  n/a    isometrics 2 x 20: n/a    education: basic concept of tissue reorientation, collagen reorganization, etc. associated with low load prolonged stretching    ube: n/a            A: overall shoulder mobility and light use continue to improve; follow through with HEP looks consistent, great effort with clinic exercises            P:fix gh hypomobility  "

## 2017-06-28 RX ORDER — DICYCLOMINE HYDROCHLORIDE 10 MG/1
CAPSULE ORAL
Qty: 90 CAPSULE | Refills: 0 | Status: SHIPPED | OUTPATIENT
Start: 2017-06-28 | End: 2017-07-28 | Stop reason: SDUPTHER

## 2017-06-29 RX ORDER — HYDROCODONE BITARTRATE AND ACETAMINOPHEN 10; 325 MG/1; MG/1
1 TABLET ORAL EVERY 4 HOURS PRN
Qty: 40 TABLET | Refills: 0 | Status: SHIPPED | OUTPATIENT
Start: 2017-06-29 | End: 2017-07-06 | Stop reason: ALTCHOICE

## 2017-06-29 NOTE — TELEPHONE ENCOUNTER
----- Message from Merlyn Haynes sent at 6/29/2017 10:40 AM CDT -----  Contact: SELF  Patient needs refills on pain meds

## 2017-06-30 ENCOUNTER — CLINICAL SUPPORT (OUTPATIENT)
Dept: REHABILITATION | Facility: HOSPITAL | Age: 67
End: 2017-06-30
Attending: ORTHOPAEDIC SURGERY
Payer: MEDICARE

## 2017-06-30 DIAGNOSIS — M25.511 RIGHT SHOULDER PAIN, UNSPECIFIED CHRONICITY: Primary | ICD-10-CM

## 2017-06-30 DIAGNOSIS — R29.898 SHOULDER WEAKNESS: ICD-10-CM

## 2017-06-30 DIAGNOSIS — M25.611 SHOULDER STIFFNESS, RIGHT: ICD-10-CM

## 2017-06-30 PROCEDURE — 97140 MANUAL THERAPY 1/> REGIONS: CPT | Mod: PN

## 2017-06-30 PROCEDURE — 97110 THERAPEUTIC EXERCISES: CPT | Mod: PN

## 2017-06-30 NOTE — PROGRESS NOTES
Time in 9  Time out 10      Timed units  2 manual                              Time:9-930  2 therex                              Time:930-10      S:good reduction in pain intensity and frequency since last visit  Pain:continues to decrease in intensity and frequency    O:  HEP: reviewed    manual tx:     gh resting position traction, prom circumduction, bursal massage abd 1 and 2, axial traction 90 abd  abd pain relief gh resting position traction, slide; scapulothoracic tx and subscapularis stretch not done; warm up rhythmic inf slides, abd with inf slides    prom as tolerated-pain free: n/a    stretches as tolerated-pain free: er 0 abd, sleeper    theraband 2 x 20:  n/a    isometrics 2 x 20: n/a    education: diablo effect and need to resolve gh hypomobility to discourage subacromial generated pain    ube: n/a            A: gh capsular pattern decreasing, manual tx and stretches should be focus of tx            P:fix gh stiffness then screen remaining elevation chain prn as well as pec major clavicular head length

## 2017-07-03 RX ORDER — TIZANIDINE 4 MG/1
TABLET ORAL
Qty: 90 TABLET | Refills: 0 | Status: SHIPPED | OUTPATIENT
Start: 2017-07-03 | End: 2017-07-26 | Stop reason: SDUPTHER

## 2017-07-06 ENCOUNTER — OFFICE VISIT (OUTPATIENT)
Dept: ORTHOPEDICS | Facility: CLINIC | Age: 67
End: 2017-07-06
Payer: MEDICARE

## 2017-07-06 VITALS
BODY MASS INDEX: 35.35 KG/M2 | WEIGHT: 261 LBS | HEIGHT: 72 IN | DIASTOLIC BLOOD PRESSURE: 82 MMHG | HEART RATE: 101 BPM | SYSTOLIC BLOOD PRESSURE: 134 MMHG

## 2017-07-06 DIAGNOSIS — M75.31 CALCIFIC TENDINITIS OF RIGHT SHOULDER: Primary | ICD-10-CM

## 2017-07-06 PROCEDURE — 20610 DRAIN/INJ JOINT/BURSA W/O US: CPT | Mod: PBBFAC,PN | Performed by: ORTHOPAEDIC SURGERY

## 2017-07-06 PROCEDURE — 99213 OFFICE O/P EST LOW 20 MIN: CPT | Mod: PBBFAC,PN,25 | Performed by: ORTHOPAEDIC SURGERY

## 2017-07-06 PROCEDURE — 99999 PR PBB SHADOW E&M-EST. PATIENT-LVL III: CPT | Mod: PBBFAC,,, | Performed by: ORTHOPAEDIC SURGERY

## 2017-07-06 PROCEDURE — 99024 POSTOP FOLLOW-UP VISIT: CPT | Mod: S$PBB,,, | Performed by: ORTHOPAEDIC SURGERY

## 2017-07-06 RX ORDER — OXYCODONE AND ACETAMINOPHEN 5; 325 MG/1; MG/1
1 TABLET ORAL EVERY 6 HOURS PRN
Qty: 40 TABLET | Refills: 0 | Status: SHIPPED | OUTPATIENT
Start: 2017-07-06 | End: 2017-07-13 | Stop reason: SDUPTHER

## 2017-07-06 RX ORDER — TRIAMCINOLONE ACETONIDE 40 MG/ML
40 INJECTION, SUSPENSION INTRA-ARTICULAR; INTRAMUSCULAR
Status: DISCONTINUED | OUTPATIENT
Start: 2017-07-06 | End: 2017-07-06 | Stop reason: HOSPADM

## 2017-07-06 RX ADMIN — TRIAMCINOLONE ACETONIDE 40 MG: 40 INJECTION, SUSPENSION INTRA-ARTICULAR; INTRAMUSCULAR at 09:07

## 2017-07-06 NOTE — PROGRESS NOTES
This note was created using Dragon dictation software.  It occasionally misinterpreted phrases or words.      Date of surgery: May 19, 2017    Chief complaint: Right shoulder pain    History of present illness: 67-year-old male who had a history of impingement and calcific tendinitis underwent subacromial decompression with acromioplasty and distal clavicle excision.  Patient hurt his shoulder somehow on Monday.  Difficult to moving it for the last 3 days.  Patient states that he did fall about a week ago but not on the shoulder.  Still doing physical therapy.  Pain 3 out of 10 but up to an 8 out of 10 with range of motion.    Physical exam: Examination of the right shoulder shows healed surgical portals.  No erythema or drainage.  Moderate irritability.  Good range of motion.  Neurovascularly intact.    X-rays: None    Assessment: Status post right acromioplasty and distal clavicle excision    Plan: I recommended a cortisone injection to help calm everything down.  Continue with occupational therapy.  Follow-up in 2 weeks.

## 2017-07-06 NOTE — PROCEDURES
Large Joint Aspiration/Injection  Date/Time: 7/6/2017 9:58 AM  Performed by: STEVEN PERRY  Authorized by: STEVEN PERRY     Consent Done?:  Yes (Verbal)  Indications:  Pain  Procedure site marked: Yes    Timeout: Prior to procedure the correct patient, procedure, and site was verified      Location:  Shoulder  Site:  R subacromial bursa  Prep: Patient was prepped and draped in usual sterile fashion    Ultrasonic Guidance for needle placement: No  Needle size:  20 G  Approach:  Posterior  Medications:  40 mg triamcinolone acetonide 40 mg/mL  Patient tolerance:  Patient tolerated the procedure well with no immediate complications

## 2017-07-07 ENCOUNTER — CLINICAL SUPPORT (OUTPATIENT)
Dept: REHABILITATION | Facility: HOSPITAL | Age: 67
End: 2017-07-07
Attending: FAMILY MEDICINE
Payer: MEDICARE

## 2017-07-07 DIAGNOSIS — R29.898 SHOULDER WEAKNESS: ICD-10-CM

## 2017-07-07 DIAGNOSIS — M25.511 RIGHT SHOULDER PAIN, UNSPECIFIED CHRONICITY: Primary | ICD-10-CM

## 2017-07-07 DIAGNOSIS — M25.611 SHOULDER STIFFNESS, RIGHT: ICD-10-CM

## 2017-07-07 NOTE — PROGRESS NOTES
"Time in 1  Time out 115        S:reports on 7-3-17 early a.m. Woke up in intense pain, does not attribute this to any particular event; saw ortho yesterday and got injection  Pain:6/10 at rest today    O:  r prom er at 0 abd 40   at 45 abd 66    at 90 abd 80     slir 50         func ir lateral back pocket (+++) empty; all others capsular  l prom er at 0 abd  80   at 45 abd 80    At  90 abd  80     Slir 80         func ir 14" lift off    Told pt. Proper ice use for pain, put stretches on hold, and to use r ue for light painless nonrepetitive use only        A:increased pain and decreased rom since last visit            P: fix gh hypomobility  "

## 2017-07-10 ENCOUNTER — CLINICAL SUPPORT (OUTPATIENT)
Dept: REHABILITATION | Facility: HOSPITAL | Age: 67
End: 2017-07-10
Attending: FAMILY MEDICINE
Payer: MEDICARE

## 2017-07-10 DIAGNOSIS — M25.611 STIFFNESS OF RIGHT SHOULDER JOINT: ICD-10-CM

## 2017-07-10 DIAGNOSIS — R29.898 SHOULDER WEAKNESS: ICD-10-CM

## 2017-07-10 DIAGNOSIS — M25.511 RIGHT SHOULDER PAIN, UNSPECIFIED CHRONICITY: Primary | ICD-10-CM

## 2017-07-10 DIAGNOSIS — M25.611 SHOULDER STIFFNESS, RIGHT: ICD-10-CM

## 2017-07-10 PROCEDURE — 97140 MANUAL THERAPY 1/> REGIONS: CPT | Mod: PN

## 2017-07-10 PROCEDURE — 97110 THERAPEUTIC EXERCISES: CPT | Mod: PN

## 2017-07-10 RX ORDER — ETODOLAC 400 MG/1
TABLET, FILM COATED ORAL
Qty: 60 TABLET | Refills: 0 | Status: SHIPPED | OUTPATIENT
Start: 2017-07-10 | End: 2017-08-21 | Stop reason: SDUPTHER

## 2017-07-10 NOTE — PROGRESS NOTES
Time in 1  Time out 2      Timed units  3 manual                              Time:1-145  1 therex                              Time:145-2      S: understands home stretches will be resumed next visit  Pain:roughly 5/10 at rest    O:  gh resting position traction, prom circumduction, bursal like massage abd 1 and 2, axial traction 90 abd  abd pain relief gh resting position traction, slide; scapulothoracic tx and subscapularis stretch not done; warm up rhythmic inf slides, abd with inf slides    stretches as tolerated-pain free: er 0 abd, sleeper        A: overall reactivity seems to be decreasing, resumption of gh capsular stretches important to resolve shoulder complex stiffness            P:fix gh stiffness then test prom elevation x 3 with remaining elevation chain (not ac joint) and pec major clavicular length testing prn

## 2017-07-13 ENCOUNTER — CLINICAL SUPPORT (OUTPATIENT)
Dept: REHABILITATION | Facility: HOSPITAL | Age: 67
End: 2017-07-13
Attending: FAMILY MEDICINE
Payer: MEDICARE

## 2017-07-13 DIAGNOSIS — M25.511 RIGHT SHOULDER PAIN, UNSPECIFIED CHRONICITY: Primary | ICD-10-CM

## 2017-07-13 DIAGNOSIS — M25.611 SHOULDER STIFFNESS, RIGHT: ICD-10-CM

## 2017-07-13 DIAGNOSIS — R29.898 SHOULDER WEAKNESS: ICD-10-CM

## 2017-07-13 DIAGNOSIS — M25.611 STIFFNESS OF RIGHT SHOULDER JOINT: ICD-10-CM

## 2017-07-13 PROCEDURE — 97140 MANUAL THERAPY 1/> REGIONS: CPT | Mod: PN

## 2017-07-13 PROCEDURE — 97110 THERAPEUTIC EXERCISES: CPT | Mod: PN

## 2017-07-13 RX ORDER — OXYCODONE AND ACETAMINOPHEN 5; 325 MG/1; MG/1
1 TABLET ORAL EVERY 6 HOURS PRN
Qty: 40 TABLET | Refills: 0 | Status: SHIPPED | OUTPATIENT
Start: 2017-07-13 | End: 2017-07-24 | Stop reason: SDUPTHER

## 2017-07-13 NOTE — TELEPHONE ENCOUNTER
----- Message from Merlyn Haynes sent at 7/13/2017  8:01 AM CDT -----  Contact: self  Patient needs refills for Percocet

## 2017-07-13 NOTE — PROGRESS NOTES
"Time in 1  Time out 2      Timed units  2 manual                              Time:1-130  2 therex                              Time:130-2      S:no new issues, pain decreasing  Pain:3/10 at rest    O:  r prom er at 0 abd 60   at 45 abd 70    at 90 abd 70      slir 50         func ir 14" lift off         All capsular  l prom er at 0 abd 80    at 45 abd 80   At  90 abd 80     Slir 80         func ir 14" lift off    HEP: told to resume er 0 abd and sleeper stretch    manual tx:     gh resting position traction, prom circumduction, bursal like massage abd 1 and 2, axial traction 90 abd  abd pain relief gh resting position traction, slide; scapulothoracic tx and subscapularis stretch not done; warm up rhythmic inf slides, abd with inf slides    prom as tolerated-pain free: n/a    stretches as tolerated-pain free: er 0 abd, sleeper    theraband 2 x 20:  n/a    isometrics 2 x 20: n/a    education: probable tx progression    ube: n/a        A: Overall progress towards goals steady; needs more skilled tx to properly maximize functional independence relative to dx, onset date, and tx so far            P: add remaining gh capsular stretches; fix all shoulder complex stiffness      OCCUPATIONAL THERAPY PROGRESS UPDATE      Onset Date:  About 5 years ago  SOC Date:  6-13-17  Primary Diagnosis:  r subacromial decompression, distal clavicle resection  Treatment Diagnosis:  r shoulder pain, stiffness, weakness  Precautions:  universal  Visits from SOC:  6  Functional Level Prior to SOC:  Decreased rom, use, and strength; pre symptom onset had no deficits with functional use    Updated Assessment:  Gh stiffness and overall reactivity decreasing nicely, looks consistent with HEP, light functional use with ADL below shoulder level with good increase since day 1 of OT    Goals: all ongoing    Reasons for Continuation of Therapy:  resolution of all shoulder complex tightness essential to facilitate strong, painless, and balanced " mechanics long term    Recommended Treatment Plan:           eval and tx

## 2017-07-18 ENCOUNTER — CLINICAL SUPPORT (OUTPATIENT)
Dept: REHABILITATION | Facility: HOSPITAL | Age: 67
End: 2017-07-18
Attending: FAMILY MEDICINE
Payer: MEDICARE

## 2017-07-18 DIAGNOSIS — M25.611 SHOULDER STIFFNESS, RIGHT: ICD-10-CM

## 2017-07-18 DIAGNOSIS — M25.511 RIGHT SHOULDER PAIN, UNSPECIFIED CHRONICITY: Primary | ICD-10-CM

## 2017-07-18 DIAGNOSIS — R29.898 SHOULDER WEAKNESS: ICD-10-CM

## 2017-07-18 PROCEDURE — 97140 MANUAL THERAPY 1/> REGIONS: CPT | Mod: PN

## 2017-07-18 PROCEDURE — 97110 THERAPEUTIC EXERCISES: CPT | Mod: PN

## 2017-07-18 NOTE — PROGRESS NOTES
"Time in 1  Time out 2      Timed units  2 manual                              Time:1-130  2 therex                              Time:130-2      S:no new issues  Pain:continues to decrease in intensity and frequency    O:  r prom er at 0 abd 70   at 45 abd 90    at 90 abd 90      slir 70         func ir 14" lift off       All capsular  l prom er at 0 abd  80   at 45 abd 80   At  90 abd 80     Slir  80        func ir 14" lift off    HEP: reviewed    manual tx:     gh resting position traction, prom circumduction, bursal like massage abd 1 and 2, axial traction 90 abd  abd pain relief gh resting position traction, slide; scapulothoracic tx and subscapularis stretch not done; warm up rhythmic inf slides, abd with inf slides    prom as tolerated-pain free: n/a    stretches as tolerated-pain free: er 0 abd, sleeper    theraband 2 x 20:  n/a    isometrics 2 x 20: n/a    education: explained  LHB most likely irritated due to diablo effect    ube: n/a    LHB stretch (+), resisted er 0 abd (-), resisted ir 0 abd (-)    A:resolving gh hypomobility and any elevation tightness critical to promote strong, painless, and balanced use long term            P:fix gh stiffness  "

## 2017-07-20 ENCOUNTER — OFFICE VISIT (OUTPATIENT)
Dept: ORTHOPEDICS | Facility: CLINIC | Age: 67
End: 2017-07-20
Payer: MEDICARE

## 2017-07-20 VITALS
HEIGHT: 72 IN | DIASTOLIC BLOOD PRESSURE: 73 MMHG | WEIGHT: 261 LBS | BODY MASS INDEX: 35.35 KG/M2 | SYSTOLIC BLOOD PRESSURE: 142 MMHG | HEART RATE: 91 BPM

## 2017-07-20 DIAGNOSIS — M75.31 CALCIFIC TENDINITIS OF RIGHT SHOULDER: Primary | ICD-10-CM

## 2017-07-20 PROCEDURE — 99213 OFFICE O/P EST LOW 20 MIN: CPT | Mod: PBBFAC,PN | Performed by: ORTHOPAEDIC SURGERY

## 2017-07-20 PROCEDURE — 99999 PR PBB SHADOW E&M-EST. PATIENT-LVL III: CPT | Mod: PBBFAC,,, | Performed by: ORTHOPAEDIC SURGERY

## 2017-07-20 PROCEDURE — 99024 POSTOP FOLLOW-UP VISIT: CPT | Mod: ,,, | Performed by: ORTHOPAEDIC SURGERY

## 2017-07-20 NOTE — PROGRESS NOTES
This note was created using Dragon dictation software.  It occasionally misinterpreted phrases or words.      Date of surgery: May 19, 2017    Chief complaint: Right shoulder pain    History of present illness: 67-year-old male who had a history of impingement and calcific tendinitis underwent subacromial decompression with acromioplasty and distal clavicle excision.  The injection that we did for her shoulder didn't really help.  Physical therapy is helping.  Pain is back to a 3 out of 10.    Physical exam: Examination of the right shoulder shows healed surgical portals.  No erythema or drainage.  Moderate irritability.  Good range of motion.  Neurovascularly intact.    X-rays: None    Assessment: Status post right acromioplasty and distal clavicle excision    Plan: Continue the physical therapy.  Follow-up in 6 weeks.

## 2017-07-21 ENCOUNTER — OFFICE VISIT (OUTPATIENT)
Dept: FAMILY MEDICINE | Facility: CLINIC | Age: 67
End: 2017-07-21
Payer: MEDICARE

## 2017-07-21 VITALS
HEIGHT: 72 IN | SYSTOLIC BLOOD PRESSURE: 132 MMHG | TEMPERATURE: 98 F | RESPIRATION RATE: 18 BRPM | BODY MASS INDEX: 35.5 KG/M2 | DIASTOLIC BLOOD PRESSURE: 84 MMHG | WEIGHT: 262.13 LBS | HEART RATE: 76 BPM

## 2017-07-21 DIAGNOSIS — K57.32 DIVERTICULITIS OF LARGE INTESTINE WITHOUT PERFORATION OR ABSCESS WITHOUT BLEEDING: Primary | ICD-10-CM

## 2017-07-21 PROCEDURE — 99999 PR PBB SHADOW E&M-EST. PATIENT-LVL III: CPT | Mod: PBBFAC,,, | Performed by: FAMILY MEDICINE

## 2017-07-21 PROCEDURE — 99214 OFFICE O/P EST MOD 30 MIN: CPT | Mod: S$PBB,,, | Performed by: FAMILY MEDICINE

## 2017-07-21 PROCEDURE — 99213 OFFICE O/P EST LOW 20 MIN: CPT | Mod: PBBFAC,PO | Performed by: FAMILY MEDICINE

## 2017-07-21 RX ORDER — AMOXICILLIN AND CLAVULANATE POTASSIUM 875; 125 MG/1; MG/1
1 TABLET, FILM COATED ORAL 2 TIMES DAILY
Qty: 14 TABLET | Refills: 0 | Status: SHIPPED | OUTPATIENT
Start: 2017-07-21 | End: 2017-07-28

## 2017-07-21 NOTE — PATIENT INSTRUCTIONS
Discharge Instructions for Diverticulitis  You have been diagnosed with diverticulitis. This is a condition in which small pouches form in your colon (large intestine) and become inflamed or infected. Follow the guidelines below for home care.  As you recover  Tips for recovery include:  · Eat a low-fiber diet. Your healthcare provider may advise a liquid diet. This gives your bowel a chance to rest so that it can recover.  · Foods to include: flake cereal, mashed potatoes, pancakes, waffles, pasta, white bread, rice, applesauce, bananas, eggs, fish, poultry, tofu, and well-cooked vegetables  · Take your medicines as directed. Do not stop taking the medicines, even if you feel better.  · Monitor your temperature and report any rising temperature to your healthcare provider.  · Take antibiotics exactly as directed. Do not miss any and keep taking them even if you feel better.   · Drink 6 to 8 glasses of water every day, unless directed otherwise.  · Use a heating pad or hot water bottle to reduce abdominal cramping or pain.  Preventing diverticulitis in the future  Tips for prevention include:  · Eat a high-fiber diet. Fiber adds bulk to the stool so that it passes through the large intestine more easily.  · Keep drinking 6 to 8 glasses of water every day, unless directed otherwise.  · Begin an exercise program. Ask your healthcare provider how to get started. You can benefit from simple activities such as walking or gardening.  · Treat diarrhea with a bland diet. Start with liquids only, then slowly add fiber over time.  · Watch for changes in your bowel movements (constipation to diarrhea).  · Avoid constipation with fiber and add a stool softener if needed.   · Get plenty of rest and sleep.  Follow-up care  Make a follow-up appointment as directed by our staff.  When to call your healthcare provider  Call your healthcare provider immediately if you have any of the following:  · Fever of 100.4°F (38.0°C) or  higher, or as directed by your healthcare provider  · Chills  · Severe cramps in the belly, most commonly the lower left side  · Tenderness in the belly, most commonly the lower left side  · Nausea and vomiting  · Bleeding from your rectum   Date Last Reviewed: 7/1/2016  © 6721-7119 [employer]. [address] [place] [address], [place] [address]. All rights reserved. This information is not intended as a substitute for professional medical care. Always follow your healthcare professional's instructions.

## 2017-07-21 NOTE — PROGRESS NOTES
Subjective:       Patient ID: Jorge Gipson is a 67 y.o. male.    Chief Complaint: diverticulits (c/o sharp lower left abd pain that started yesterday)    Abdominal Pain   This is a new problem. The current episode started yesterday. The onset quality is gradual. The problem occurs constantly. The problem has been gradually worsening. The pain is located in the LLQ. The pain is moderate. The quality of the pain is aching. The abdominal pain radiates to the LUQ. Pertinent negatives include no fever, hematochezia, melena, nausea or vomiting. Associated symptoms comments: Felt sweats/chills last night.. The pain is aggravated by movement and palpation. The pain is relieved by nothing.     Review of Systems   Constitutional: Negative for fever.   Respiratory: Negative for shortness of breath.    Cardiovascular: Negative for chest pain.   Gastrointestinal: Positive for abdominal pain. Negative for hematochezia, melena, nausea and vomiting.        Stools vary from hard to loose   Skin: Negative for rash.   Neurological: Negative for numbness.   All other systems reviewed and are negative.      Objective:      Physical Exam   Constitutional: He appears well-developed. No distress.   HENT:   Mouth/Throat: Oropharynx is clear and moist.   Neck: Neck supple.   Cardiovascular: Normal rate and regular rhythm.    No murmur heard.  Pulmonary/Chest: Effort normal and breath sounds normal.   Abdominal: Bowel sounds are normal. There is tenderness in the left upper quadrant and left lower quadrant. There is no guarding.   Lymphadenopathy:     He has no cervical adenopathy.   Skin: Skin is warm and dry.       Assessment:       1. Diverticulitis of large intestine without perforation or abscess without bleeding        Plan:         Jorge was seen today for diverticulits.    Diagnoses and all orders for this visit:    Diverticulitis of large intestine without perforation or abscess without bleeding    Other orders  -      amoxicillin-clavulanate 875-125mg (AUGMENTIN) 875-125 mg per tablet; Take 1 tablet by mouth 2 (two) times daily. Take after meals.    Discussed and given written DI's.

## 2017-07-24 RX ORDER — OXYCODONE AND ACETAMINOPHEN 5; 325 MG/1; MG/1
1 TABLET ORAL EVERY 6 HOURS PRN
Qty: 40 TABLET | Refills: 0 | Status: SHIPPED | OUTPATIENT
Start: 2017-07-24 | End: 2017-07-31 | Stop reason: SDUPTHER

## 2017-07-26 RX ORDER — TIZANIDINE 4 MG/1
TABLET ORAL
Qty: 90 TABLET | Refills: 0 | Status: SHIPPED | OUTPATIENT
Start: 2017-07-26 | End: 2017-08-28 | Stop reason: SDUPTHER

## 2017-07-28 ENCOUNTER — TELEPHONE (OUTPATIENT)
Dept: GASTROENTEROLOGY | Facility: CLINIC | Age: 67
End: 2017-07-28

## 2017-07-28 RX ORDER — DICYCLOMINE HYDROCHLORIDE 10 MG/1
CAPSULE ORAL
Qty: 90 CAPSULE | Refills: 0 | Status: SHIPPED | OUTPATIENT
Start: 2017-07-28 | End: 2017-08-01 | Stop reason: SDUPTHER

## 2017-07-28 NOTE — TELEPHONE ENCOUNTER
Patient wife offered first available next week with NP states patient in too much pain. Instructed needs to go to ER for evaluation.

## 2017-07-28 NOTE — TELEPHONE ENCOUNTER
----- Message from Radha Shine sent at 7/28/2017 11:17 AM CDT -----  Contact: Wife Cookie Gary states her  is out of his antibiotics today and he does not feel better. She said he thinks his diverticulitis has gotten worse. He wants to know what to do next. Next appointment is not until August 15. Please call [phone] thanks!

## 2017-07-31 ENCOUNTER — CLINICAL SUPPORT (OUTPATIENT)
Dept: REHABILITATION | Facility: HOSPITAL | Age: 67
End: 2017-07-31
Attending: FAMILY MEDICINE
Payer: MEDICARE

## 2017-07-31 ENCOUNTER — DOCUMENTATION ONLY (OUTPATIENT)
Dept: FAMILY MEDICINE | Facility: CLINIC | Age: 67
End: 2017-07-31

## 2017-07-31 DIAGNOSIS — R29.898 SHOULDER WEAKNESS: ICD-10-CM

## 2017-07-31 DIAGNOSIS — M25.511 RIGHT SHOULDER PAIN, UNSPECIFIED CHRONICITY: Primary | ICD-10-CM

## 2017-07-31 DIAGNOSIS — M25.611 SHOULDER STIFFNESS, RIGHT: ICD-10-CM

## 2017-07-31 PROCEDURE — 97110 THERAPEUTIC EXERCISES: CPT | Mod: PN

## 2017-07-31 PROCEDURE — 97140 MANUAL THERAPY 1/> REGIONS: CPT | Mod: PN

## 2017-07-31 RX ORDER — OXYCODONE AND ACETAMINOPHEN 5; 325 MG/1; MG/1
1 TABLET ORAL EVERY 6 HOURS PRN
Qty: 40 TABLET | Refills: 0 | Status: SHIPPED | OUTPATIENT
Start: 2017-07-31 | End: 2017-08-09

## 2017-07-31 NOTE — PROGRESS NOTES
Pre-Visit Chart Review  For Appointment Scheduled on 08/01/2017    Health Maintenance Due   Topic Date Due    Abdominal Aortic Aneurysm Screening  01/29/2017

## 2017-07-31 NOTE — TELEPHONE ENCOUNTER
----- Message from Nicole Pop sent at 7/31/2017  8:05 AM CDT -----  Contact: Mr Jorge Espino [phone]  Good Morning   Pt is requesting refill on meds Percocet. [facility] in [place] on [facility]

## 2017-07-31 NOTE — PROGRESS NOTES
"Time in 2  Time out 3      Timed units  2 manual                              Time:2-230  2 therex                              Time:230-3      S:doing HEP  Pain:continues to decrease in intensity and frequency    O:  r prom er at 0 abd 80  at 45 abd 90     at 90 abd 90      slir 70         func ir 14'' lift off         All capsular  l prom er at 0 abd 80    at 45 abd 80    At  90 abd 80     Slir 80         func ir 14" lift off        Old surgery    HEP: reviewed    manual tx:     gh resting position traction, prom circumduction, bursal like massage abd 1 and 2, axial traction 90 abd  abd pain relief gh resting position traction, slide; scapulothoracic tx and subscapularis stretch not done; warm up rhythmic inf slides, abd with inf slides    prom as tolerated-pain free: n/a    stretches as tolerated-pain free: sleeper    theraband 2 x 20:  n/a    isometrics 2 x 20: n/a    education: need to resolve gh hypomobility    ube: n/a    Explained LHB pain discovered last visit most likely due to diablo effect and bicep overuse since it functions as a secondary elevator plus scapula and cuff muscles have not been strengthened yet      A: overall stiffness and reactivity continues to decrease, light use below shoulder level wnl            P: fix all shoulder complex stiffness and transition to scapula and cuff PRE to promote strong, painless, and balanced use and mechanics with required tasks long term  "

## 2017-08-01 ENCOUNTER — OFFICE VISIT (OUTPATIENT)
Dept: FAMILY MEDICINE | Facility: CLINIC | Age: 67
End: 2017-08-01
Payer: MEDICARE

## 2017-08-01 ENCOUNTER — HOSPITAL ENCOUNTER (OUTPATIENT)
Dept: RADIOLOGY | Facility: HOSPITAL | Age: 67
Discharge: HOME | End: 2017-08-01
Attending: FAMILY MEDICINE
Payer: MEDICARE

## 2017-08-01 ENCOUNTER — TELEPHONE (OUTPATIENT)
Dept: FAMILY MEDICINE | Facility: CLINIC | Age: 67
End: 2017-08-01

## 2017-08-01 VITALS
BODY MASS INDEX: 36.01 KG/M2 | TEMPERATURE: 98 F | WEIGHT: 265.88 LBS | HEART RATE: 82 BPM | HEIGHT: 72 IN | DIASTOLIC BLOOD PRESSURE: 83 MMHG | SYSTOLIC BLOOD PRESSURE: 133 MMHG

## 2017-08-01 DIAGNOSIS — R10.32 ABDOMINAL PAIN, LEFT LOWER QUADRANT: Primary | ICD-10-CM

## 2017-08-01 DIAGNOSIS — R10.32 ABDOMINAL PAIN, LEFT LOWER QUADRANT: ICD-10-CM

## 2017-08-01 DIAGNOSIS — E66.9 OBESITY (BMI 30-39.9): ICD-10-CM

## 2017-08-01 DIAGNOSIS — R10.9 ABDOMINAL CRAMPING: Primary | ICD-10-CM

## 2017-08-01 PROCEDURE — 74177 CT ABD & PELVIS W/CONTRAST: CPT | Mod: TC

## 2017-08-01 PROCEDURE — 74177 CT ABD & PELVIS W/CONTRAST: CPT | Mod: 26,,, | Performed by: RADIOLOGY

## 2017-08-01 PROCEDURE — 25500020 PHARM REV CODE 255

## 2017-08-01 PROCEDURE — 99999 PR PBB SHADOW E&M-EST. PATIENT-LVL V: CPT | Mod: PBBFAC,,, | Performed by: PHYSICIAN ASSISTANT

## 2017-08-01 PROCEDURE — 99214 OFFICE O/P EST MOD 30 MIN: CPT | Mod: S$PBB,,, | Performed by: PHYSICIAN ASSISTANT

## 2017-08-01 PROCEDURE — 99215 OFFICE O/P EST HI 40 MIN: CPT | Mod: PBBFAC,25,PO | Performed by: PHYSICIAN ASSISTANT

## 2017-08-01 RX ADMIN — IOHEXOL 100 ML: 350 INJECTION, SOLUTION INTRAVENOUS at 11:08

## 2017-08-01 RX ADMIN — IOHEXOL 30 ML: 350 INJECTION, SOLUTION INTRAVENOUS at 11:08

## 2017-08-01 NOTE — TELEPHONE ENCOUNTER
Pt is asking for refill of dicyclomine, but it looks like Dr. Devine sent 90 tabs on 7/28/17 did the pharmacy get that RX?

## 2017-08-01 NOTE — PROGRESS NOTES
Subjective:       Patient ID: Jorge Gipson is a 67 y.o. male.    Chief Complaint: Follow-up (Diverticulitis)    Mr. Gipson is a 67 year old male who presents to clinic for evaluation of abdominal pain. He was evaluated in clinic on 7/21/17 by Dr. Montez and was diagnosed with acute diverticulitis and started on 10 day course of augmentin. He states abdominal pain has persisted and is described as sharp stabbing pain that starts in the left lower quadrant with radiation to the left upper quadrant. He states bowel movements have been irregular with occasional loose stools. He has not been following dietary recommendations for acute diverticulitis. He denies nausea, vomiting, poor appetite, constipation, fever or chills.       Review of Systems   Constitutional: Negative for activity change, appetite change, chills, fatigue and fever.   Respiratory: Negative for cough, shortness of breath and wheezing.    Cardiovascular: Negative for chest pain, palpitations and leg swelling.   Gastrointestinal: Positive for abdominal pain and diarrhea. Negative for constipation, nausea and vomiting.   Musculoskeletal: Negative for arthralgias.   Neurological: Negative for dizziness, weakness, light-headedness and headaches.       Objective:      Vitals:    08/01/17 0917   BP: 133/83   Pulse: 82   Temp: 98.3 °F (36.8 °C)     Physical Exam   Constitutional: He is oriented to person, place, and time. He appears well-developed.   Obese body habitus.   HENT:   Head: Normocephalic and atraumatic.   Eyes: Conjunctivae and EOM are normal. Pupils are equal, round, and reactive to light.   Cardiovascular: Normal rate, regular rhythm, normal heart sounds and intact distal pulses.    Pulmonary/Chest: Effort normal and breath sounds normal.   Abdominal: Soft. Bowel sounds are normal. There is tenderness in the left lower quadrant. There is no rigidity, no rebound and no guarding.   Neurological: He is alert and oriented to person, place,  and time.   Skin: Skin is warm and dry.   Psychiatric: He has a normal mood and affect. His behavior is normal.       Assessment:       1. Abdominal pain, left lower quadrant    2. Obesity (BMI 30-39.9)        Plan:       Abdominal pain, left lower quadrant       STAT:  -     CBC auto differential; Future; Expected date: 08/01/2017  -     Comprehensive metabolic panel; Future; Expected date: 08/01/2017  -     CT Abdomen Pelvis With Contrast; Future; Expected date: 08/01/2017  - Will make further recommendations pending review of above work up    Obesity (BMI 30-39.9)        - See below    Patient readiness: acceptance and barriers:none    During the course of the visit the patient was educated and counseled about the following:     Obesity:   Not addressed at today's urgent care visit.    Goals: Obesity: Reduce calorie intake and BMI    Did patient meet goals/outcomes: No    The following self management tools provided: declined    Patient Instructions (the written plan) was given to the patient/family.     Time spent with patient: 15 minutes

## 2017-08-01 NOTE — PATIENT INSTRUCTIONS
Weight Management: Getting Started  Healthy bodies come in all shapes and sizes. Not all bodies are made to be thin. For some people, a healthy weight is higher than the average weight listed on weight charts. Your healthcare provider can help you decide on a healthy weight for you.    Reasons to lose weight  Losing weight can help with some health problems, such as high blood pressure, heart disease, diabetes, sleep apnea, and arthritis. You may also feel more energy.  Set your long-term goal  Your goal doesn't even have to be a specific weight. You may decide on a fitness goal (such as being able to walk 10 miles a week), or a health goal (such as lowering your blood pressure). Choose a goal that is measurable and reasonable, so you know when you've reached it. A goal of reaching a BMI of less than 25 is not always reasonable (or possible).   Make an action plan  Habits dont change overnight. Setting your goals too high can leave you feeling discouraged if you cant reach them. Be realistic. Choose one or two small changes you can make now. Set an action plan for how you are going to make these changes. When you can stick to this plan, keep making a few more small changes. Taking small steps will help you stay on the path to success.  Track your progress  Write down your goals. Then, keep a daily record of your progress. Write down what you eat and how active you are. This record lets you look back on how much youve done. It may also help when youre feeling frustrated. Reward yourself for success. Even if you dont reach every goal, give yourself credit for what you do get done.  Get support  Encouragement from others can help make losing weight easier. Ask your family members and friends for support. They may even want to join you. Also look to your healthcare provider, registered dietitian, and  for help. Your local hospital can give you more information about nutrition, exercise, and  weight loss.  Date Last Reviewed: 1/31/2016 © 2000-2016 [employer]. [address] [place] [address], [place] [address]. All rights reserved. This information is not intended as a substitute for professional medical care. Always follow your healthcare professional's instructions.        Walking for Fitness  Fitness walking has something for everyone, even people who are already fit. Walking is one of the safest ways to condition your body aerobically. It can boost energy, help you lose weight, and reduce stress.    Physical benefits  · Walking strengthens your heart and lungs, and tones your muscles.  · When walking, your feet land with less impact than in other sports. This reduces chances of muscle, bone, and joint injury.  · Regular walking improves your cholesterol levels and lowers your risk of heart disease. And it helps you control your blood sugar if you have diabetes.  · Walking is a weight-bearing activity, which helps maintain bone density. This can help prevent osteoporosis.  Personal rewards  · Taking walks can help you relax and manage stress. And fitness walking may make you feel better about yourself.  · Walking can help you sleep better at night and make you less likely to be depressed.  · Regular walking may help maintain your memory as you get older.  · Walking is a great way to spend extra time with friends and family members. Be sure to invite your dog along!  Q&A about fitness walking  Q: Will walking keep me fit?  A: Yes. Regular walking at the right pace gives you all the benefits of other aerobic activities, such as jogging and swimming.  Q: Will walking help me lose weight and keep it off?  A: Yes. Per mile, walking can burn as many calories as jogging. Your health care provider can help work walking into your weight-loss plan.  Q: Is walking safe for my health?  A: Yes. Walking is safe if you have high blood pressure, diabetes, heart disease, or other conditions. Talk to your health  care provider before you start.  Date Last Reviewed: 5/9/2015  © 1448-3508 [employer]. [address] [place] [address], [place] [address]. All rights reserved. This information is not intended as a substitute for professional medical care. Always follow your healthcare professional's instructions.

## 2017-08-01 NOTE — TELEPHONE ENCOUNTER
----- Message from Tish Mistry sent at 8/1/2017  1:11 PM CDT -----  called  status of med...[phone] (waiting at [facility])

## 2017-08-01 NOTE — TELEPHONE ENCOUNTER
Spoke with patient he is requesting that you rewrite the dicyclomine 10 mg for 3 times a day so he can get it refilled. Please advise

## 2017-08-03 ENCOUNTER — CLINICAL SUPPORT (OUTPATIENT)
Dept: REHABILITATION | Facility: HOSPITAL | Age: 67
End: 2017-08-03
Attending: ORTHOPAEDIC SURGERY
Payer: MEDICARE

## 2017-08-03 DIAGNOSIS — M25.611 SHOULDER STIFFNESS, RIGHT: ICD-10-CM

## 2017-08-03 DIAGNOSIS — R29.898 SHOULDER WEAKNESS: ICD-10-CM

## 2017-08-03 DIAGNOSIS — M25.511 RIGHT SHOULDER PAIN, UNSPECIFIED CHRONICITY: Primary | ICD-10-CM

## 2017-08-03 PROCEDURE — 97110 THERAPEUTIC EXERCISES: CPT | Mod: PN

## 2017-08-03 PROCEDURE — 97140 MANUAL THERAPY 1/> REGIONS: CPT | Mod: PN

## 2017-08-03 RX ORDER — DICYCLOMINE HYDROCHLORIDE 10 MG/1
10 CAPSULE ORAL 3 TIMES DAILY
Qty: 90 CAPSULE | Refills: 0 | Status: SHIPPED | OUTPATIENT
Start: 2017-08-03 | End: 2018-01-18 | Stop reason: SDUPTHER

## 2017-08-03 NOTE — TELEPHONE ENCOUNTER
Patient notified that a new Rx was sent to his pharmacy for dicyclomine. Patient verbalized understanding

## 2017-08-03 NOTE — PROGRESS NOTES
Time in 2  Time out 3      Timed units  2 manual                              Time:2-230  2 therex                              Time:230-3      S:no new issues  Pain:max decrease in intensity and frequency since 1st OT visit    O:  HEP: reviewed    manual tx:     gh resting position traction, prom circumduction, bursal like massage abd 1 and 2, axial traction 90 abd    prom as tolerated-pain free: n/a    stretches as tolerated-pain free: er 0 abd, sleeper    theraband 2 x 20:  n/a    isometrics 2 x 20: n/a    education: likely tx progression    ube: n/a            A:once inferior posterior gh hypomobility gone, prom elevation screen x 3 will be needed            P: fix all stiffness thru shoulder complex, transition to scapula and cuff PRE to promote correct mechanics and painless use long term

## 2017-08-09 RX ORDER — HYDROCODONE BITARTRATE AND ACETAMINOPHEN 10; 325 MG/1; MG/1
1 TABLET ORAL EVERY 6 HOURS PRN
Qty: 40 TABLET | Refills: 0 | Status: SHIPPED | OUTPATIENT
Start: 2017-08-09 | End: 2017-08-15 | Stop reason: SDUPTHER

## 2017-08-09 NOTE — TELEPHONE ENCOUNTER
----- Message from Josseline Griffiths sent at 8/9/2017  8:02 AM CDT -----  Contact: patient  Patient calling in regards to requesting a refill on Percocet. Please advise.  Call back   Thanks!  [facility] [place] - [id number]  [address] [facility] [address]  Phone: [phone] Fax: [phone]

## 2017-08-09 NOTE — TELEPHONE ENCOUNTER
----- Message from Ricarda Silva sent at 8/9/2017 10:16 AM CDT -----  Patient picked up his medication this morning and the prescriptions are wrong, but he wants to make sure of this. Please call for details at [phone].

## 2017-08-09 NOTE — TELEPHONE ENCOUNTER
Called pt to verify that his medication called in is correct as Dr. Morejon is trying to taper him down towards getting off of the pain medication. Pt verbalized understanding.

## 2017-08-14 ENCOUNTER — CLINICAL SUPPORT (OUTPATIENT)
Dept: REHABILITATION | Facility: HOSPITAL | Age: 67
End: 2017-08-14
Attending: ORTHOPAEDIC SURGERY
Payer: MEDICARE

## 2017-08-14 DIAGNOSIS — R29.898 SHOULDER WEAKNESS: ICD-10-CM

## 2017-08-14 DIAGNOSIS — M25.611 SHOULDER STIFFNESS, RIGHT: ICD-10-CM

## 2017-08-14 DIAGNOSIS — M25.511 RIGHT SHOULDER PAIN, UNSPECIFIED CHRONICITY: Primary | ICD-10-CM

## 2017-08-14 PROCEDURE — G8988 SELF CARE GOAL STATUS: HCPCS | Mod: CH,PN

## 2017-08-14 PROCEDURE — 97110 THERAPEUTIC EXERCISES: CPT | Mod: PN

## 2017-08-14 PROCEDURE — 97140 MANUAL THERAPY 1/> REGIONS: CPT | Mod: PN

## 2017-08-14 PROCEDURE — G8987 SELF CARE CURRENT STATUS: HCPCS | Mod: CK,PN

## 2017-08-14 NOTE — PROGRESS NOTES
"Time in 2  Time out 3      Timed units  2 manual                              Time:2-230  2 therex                              Time:230-3      S:doing HEP, light use increasing however over shoulder level tasks still limited due to pain, stiffness, and weakness  Pain:continues to decrease in intensity and frequency    O:  r prom er at 0 abd 80   at 45 abd 90    at 90 abd 90;      slir 74          func ir 14" lift off                old surgery  l prom er at 0 abd 80    at 45 abd 90   at  90 abd 90;     slir  80        func ir 14" lift off    HEP: explained likely progression    manual tx:     passive supine scaption 0-45 with ir/er  gh resting position traction, prom circumduction, bursal like massage abd 1 and 2, axial traction 90 abd  abd pain relief gh resting position traction, slide; scapulothoracic tx and subscapularis stretch not done; warm up rhythmic inf slides, abd with inf slides    prom as tolerated-pain free: n/a    stretches as tolerated-pain free: sleeper    theraband 2 x 20:  n/a    isometrics 2 x 20: n/a    education: scapula dyskinesis    ube: n/a        A:fixing gh stiffness then transitioning to d2 stretch/elevation chain tx and eventually issuing scapula and cuff PRE HEP imperative to promote correct mechanics            P:fix gh stiffness      OCCUPATIONAL THERAPY PROGRESS UPDATE      Onset Date:  About 5 years ago  SOC Date:  6-13-17  Primary Diagnosis:  r subacromial decompression, distal clavicle resection  Treatment Diagnosis:  r shoulder pain, stiffness, weakness  Precautions:  universal  Visits from SOC:  10  Functional Level Prior to SOC:  Decreased rom, use, and strength; pre symptom onset had no deficits with functional use    Updated Assessment:  r shoulder with excellent flexibility, over shoulder use with mild deficits still due to pain and weakness    Goals:  All ongoing    Reasons for Continuation of Therapy:  Fixing remaining stiffness (taking into consideration l shoulder had " old surgery) and progressing to a d/c stretch weaning and PRE HEP imperative to discourage imbalanced contractile imput long term as well as limited use    Recommended Treatment Plan:  eval and tx    FIM  Current g code CK min a 40-60% impairment  Goal g code      CI independent 0% impairment

## 2017-08-15 RX ORDER — HYDROCODONE BITARTRATE AND ACETAMINOPHEN 10; 325 MG/1; MG/1
1 TABLET ORAL EVERY 6 HOURS PRN
Qty: 40 TABLET | Refills: 0 | Status: SHIPPED | OUTPATIENT
Start: 2017-08-15 | End: 2017-08-25

## 2017-08-15 NOTE — TELEPHONE ENCOUNTER
----- Message from Kimberly sEpino sent at 8/15/2017  8:02 AM CDT -----  Contact: self  Needs refill on pain meds, states the hydrocodone is working well.  Please call back at [phone] (home)     [facility] [address] [place], [place] [address] - [address] [address] [address]  [address] [facility] [address]  Phone: [phone] Fax: [phone]

## 2017-08-18 ENCOUNTER — CLINICAL SUPPORT (OUTPATIENT)
Dept: REHABILITATION | Facility: HOSPITAL | Age: 67
End: 2017-08-18
Attending: ORTHOPAEDIC SURGERY
Payer: MEDICARE

## 2017-08-18 DIAGNOSIS — M25.611 SHOULDER STIFFNESS, RIGHT: ICD-10-CM

## 2017-08-18 DIAGNOSIS — M25.611 STIFFNESS OF RIGHT SHOULDER JOINT: ICD-10-CM

## 2017-08-18 DIAGNOSIS — M25.511 RIGHT SHOULDER PAIN, UNSPECIFIED CHRONICITY: Primary | ICD-10-CM

## 2017-08-18 DIAGNOSIS — R29.898 SHOULDER WEAKNESS: ICD-10-CM

## 2017-08-18 PROCEDURE — 97140 MANUAL THERAPY 1/> REGIONS: CPT | Mod: PN

## 2017-08-18 PROCEDURE — 97110 THERAPEUTIC EXERCISES: CPT | Mod: PN

## 2017-08-18 NOTE — PROGRESS NOTES
"Time in 2  Time out 3      Timed units  2 manual                              Time:2-230  2 therex                              Time:230-3      S: understands transition to full shoulder complex PRE HEP will happen soon, understands proper ice use for pain control prn  Pain:max decrease in intensity and frequency since day 1 of OT    O:    l shoulder with old surgery    prom slir r 80 l 80    prom                  r                                        l     flex               160                                      150  d2             1.5" olecranon to surface      3"  abd                180                                      180    HEP: n/a    manual tx:     passive supine scaption 0-45 with ir/er  abd pain relief gh resting position traction, slide; scapulothoracic tx and subscapularis stretch not done; warm up rhythmic inf slides, abd with inf slides  gh resting position traction, prom circumduction, bursal like massage abd 1 and 2, axial traction 90 abd    prom as tolerated-pain free: n/a    stretches as tolerated-pain free: er 0 abd, sleeper    theraband 2 x 20:  gray row, add, ir 0 abd    isometrics 2 x 20: n/a    education: explained classically passive elevation x 3 becoming = to unaffected side is desirable however unfair comparison can be made in this case since l shoulder with old surgery    ube: n/a      A: passive elevation on r wfl, any additional flexibility that is available but not visible now should reveal itself as use increases in future; progressive scapula and cuff PRE should be tx emphasis to facilitate balanced mechanics and full use with required tasks            P:transiton to d/c stretching and PRE HEP  "

## 2017-08-21 RX ORDER — ETODOLAC 400 MG/1
TABLET, FILM COATED ORAL
Qty: 60 TABLET | Refills: 0 | Status: SHIPPED | OUTPATIENT
Start: 2017-08-21

## 2017-08-22 ENCOUNTER — OFFICE VISIT (OUTPATIENT)
Dept: GASTROENTEROLOGY | Facility: CLINIC | Age: 67
End: 2017-08-22
Payer: MEDICARE

## 2017-08-22 VITALS
HEART RATE: 109 BPM | BODY MASS INDEX: 35.56 KG/M2 | HEIGHT: 72 IN | SYSTOLIC BLOOD PRESSURE: 132 MMHG | DIASTOLIC BLOOD PRESSURE: 85 MMHG | WEIGHT: 262.56 LBS

## 2017-08-22 DIAGNOSIS — R93.89 ABNORMAL CT SCAN: ICD-10-CM

## 2017-08-22 DIAGNOSIS — K63.89 EPIPLOIC APPENDAGITIS: ICD-10-CM

## 2017-08-22 DIAGNOSIS — R10.32 LLQ PAIN: ICD-10-CM

## 2017-08-22 DIAGNOSIS — K57.92 DIVERTICULITIS OF INTESTINE, UNSPECIFIED BLEEDING STATUS, UNSPECIFIED COMPLICATION STATUS, UNSPECIFIED PART OF INTESTINAL TRACT: ICD-10-CM

## 2017-08-22 DIAGNOSIS — Z86.010 PERSONAL HISTORY OF COLONIC POLYPS: Primary | ICD-10-CM

## 2017-08-22 PROCEDURE — 99999 PR PBB SHADOW E&M-EST. PATIENT-LVL III: CPT | Mod: PBBFAC,,, | Performed by: INTERNAL MEDICINE

## 2017-08-22 PROCEDURE — 99214 OFFICE O/P EST MOD 30 MIN: CPT | Mod: S$PBB,,, | Performed by: INTERNAL MEDICINE

## 2017-08-22 PROCEDURE — 3075F SYST BP GE 130 - 139MM HG: CPT | Mod: ,,, | Performed by: INTERNAL MEDICINE

## 2017-08-22 PROCEDURE — 3079F DIAST BP 80-89 MM HG: CPT | Mod: ,,, | Performed by: INTERNAL MEDICINE

## 2017-08-22 PROCEDURE — 99213 OFFICE O/P EST LOW 20 MIN: CPT | Mod: PBBFAC,PO | Performed by: INTERNAL MEDICINE

## 2017-08-22 NOTE — PROGRESS NOTES
Subjective:       Patient ID: Jorge Gipson is a 67 y.o. male.    This is an established patient.      Chief Complaint: Abdominal Pain     Abdominal Pain   This is a new problem. The current episode started more than 1 month ago. The onset quality is sudden. The problem occurs constantly. Duration: 10 days. The problem has been resolved. The pain is located in the LLQ. The pain is at a severity of 5/10. The pain is moderate. The quality of the pain is sharp. The abdominal pain does not radiate. Pertinent negatives include no constipation, diarrhea, fever, hematochezia, nausea, vomiting or weight loss. Nothing aggravates the pain. The pain is relieved by nothing. He has tried antibiotics for the symptoms. The treatment provided no relief (Later spontaneously resolved). Prior diagnostic workup includes CT scan (showed epiploic appendagitis).     Review of Systems   Constitutional: Negative for chills, fatigue, fever and weight loss.   Respiratory: Negative for cough, shortness of breath and wheezing.    Cardiovascular: Negative for chest pain and palpitations.   Gastrointestinal: Positive for abdominal pain (LLQ). Negative for constipation, diarrhea, hematochezia, nausea and vomiting.   All other systems reviewed and are negative.      Objective:       Vitals:    08/22/17 1559   BP: 132/85   Pulse: 109   Weight: 119.1 kg (262 lb 9.1 oz)   Height: 6' (1.829 m)       Physical Exam   Constitutional: He is oriented to person, place, and time. He appears well-developed and well-nourished.   HENT:   Head: Normocephalic and atraumatic.   Mouth/Throat: Oropharynx is clear and moist.   Eyes: Conjunctivae are normal. Pupils are equal, round, and reactive to light. No scleral icterus.   Cardiovascular: Normal rate and regular rhythm.    No murmur heard.  Pulmonary/Chest: Effort normal and breath sounds normal. He has no wheezes.   Abdominal: Soft. Bowel sounds are normal. He exhibits no distension. There is no tenderness.    Neurological: He is alert and oriented to person, place, and time.   Psychiatric: He has a normal mood and affect. His behavior is normal.   Vitals reviewed.        CT scan was independently visualized and reviewed by me and showed epiploic appendagitis and diverticulosis.      Lab Results   Component Value Date    WBC 7.50 08/01/2017    HGB 13.4 (L) 08/01/2017    HCT 38.8 (L) 08/01/2017    MCV 93 08/01/2017     08/01/2017       CMP  Sodium   Date Value Ref Range Status   08/01/2017 140 136 - 145 mmol/L Final     Potassium   Date Value Ref Range Status   08/01/2017 4.2 3.5 - 5.1 mmol/L Final     Chloride   Date Value Ref Range Status   08/01/2017 103 95 - 110 mmol/L Final     CO2   Date Value Ref Range Status   08/01/2017 27 23 - 29 mmol/L Final     Glucose   Date Value Ref Range Status   08/01/2017 90 70 - 110 mg/dL Final     BUN, Bld   Date Value Ref Range Status   08/01/2017 20 8 - 23 mg/dL Final     Creatinine   Date Value Ref Range Status   08/01/2017 0.8 0.5 - 1.4 mg/dL Final     Calcium   Date Value Ref Range Status   08/01/2017 9.5 8.7 - 10.5 mg/dL Final     Total Protein   Date Value Ref Range Status   08/01/2017 6.8 6.0 - 8.4 g/dL Final     Albumin   Date Value Ref Range Status   08/01/2017 3.7 3.5 - 5.2 g/dL Final     Total Bilirubin   Date Value Ref Range Status   08/01/2017 0.3 0.1 - 1.0 mg/dL Final     Comment:     For infants and newborns, interpretation of results should be based  on gestational age, weight and in agreement with clinical  observations.  Premature Infant recommended reference ranges:  Up to 24 hours.............<8.0 mg/dL  Up to 48 hours............<12.0 mg/dL  3-5 days..................<15.0 mg/dL  6-29 days.................<15.0 mg/dL       Alkaline Phosphatase   Date Value Ref Range Status   08/01/2017 66 55 - 135 U/L Final     AST   Date Value Ref Range Status   08/01/2017 13 10 - 40 U/L Final     ALT   Date Value Ref Range Status   08/01/2017 19 10 - 44 U/L Final      Anion Gap   Date Value Ref Range Status   08/01/2017 10 8 - 16 mmol/L Final     eGFR if    Date Value Ref Range Status   08/01/2017 >60 >60 mL/min/1.73 m^2 Final     eGFR if non    Date Value Ref Range Status   08/01/2017 >60 >60 mL/min/1.73 m^2 Final     Comment:     Calculation used to obtain the estimated glomerular filtration  rate (eGFR) is the CKD-EPI equation. Since race is unknown   in our information system, the eGFR values for   -American and Non--American patients are given   for each creatinine result.       Single adenoma noted 5 years ago.    Assessment:       1. Personal history of colonic polyps    2. Diverticulitis of intestine, unspecified bleeding status, unspecified complication status, unspecified part of intestinal tract    3. Abnormal CT scan    4. LLQ pain    5. Epiploic appendagitis        Plan:       1.  Schedule colonoscopy for polyp surveillance  2.  No further intervention at this time for epiploic appendagitis  3.  High fiber diet  4.  Further recommendations to follow after above.

## 2017-08-25 ENCOUNTER — CLINICAL SUPPORT (OUTPATIENT)
Dept: REHABILITATION | Facility: HOSPITAL | Age: 67
End: 2017-08-25
Attending: ORTHOPAEDIC SURGERY
Payer: MEDICARE

## 2017-08-25 DIAGNOSIS — M25.611 STIFFNESS OF RIGHT SHOULDER JOINT: ICD-10-CM

## 2017-08-25 DIAGNOSIS — M25.611 SHOULDER STIFFNESS, RIGHT: ICD-10-CM

## 2017-08-25 DIAGNOSIS — M25.511 RIGHT SHOULDER PAIN, UNSPECIFIED CHRONICITY: Primary | ICD-10-CM

## 2017-08-25 DIAGNOSIS — R29.898 SHOULDER WEAKNESS: ICD-10-CM

## 2017-08-25 PROCEDURE — 97110 THERAPEUTIC EXERCISES: CPT | Mod: PN

## 2017-08-25 PROCEDURE — 97140 MANUAL THERAPY 1/> REGIONS: CPT | Mod: PN

## 2017-08-25 RX ORDER — HYDROCODONE BITARTRATE AND ACETAMINOPHEN 7.5; 325 MG/1; MG/1
1 TABLET ORAL EVERY 6 HOURS PRN
Qty: 40 TABLET | Refills: 0 | Status: SHIPPED | OUTPATIENT
Start: 2017-08-25 | End: 2017-09-05

## 2017-08-25 NOTE — TELEPHONE ENCOUNTER
----- Message from Maria C Coleman sent at 8/25/2017  8:03 AM CDT -----  Contact: will   Refill on pain medication   [facility] on [address]   Call back

## 2017-08-25 NOTE — PROGRESS NOTES
Time in 3  Time out 4      Timed units  2 manual                              Time:3-330  2 therex                              Time:330-4      S: used sprayer yesterday with gardening with some increased soreness post, understands proper ice use for pain  Pain:in general since 1st OT visit continues to decrease in intensity and frequency    O:  HEP: added purple row, add, ir 0 abd    manual tx:     passive supine scaption 0-45 with ir/er  gh resting position traction, prom circumduction, bursal like massage abd 1 and 2, axial traction 90 abd  abd pain relief gh resting position traction, slide; scapulothoracic tx and subscapularis stretch not done; warm up rhythmic inf slides, abd with inf slides    prom as tolerated-pain free: n/a    stretches as tolerated-pain free: n/a    theraband 2 x 20:  gray row, add, ir 0 abd    isometrics 2 x 20: n/a    education: need for PRE    ube: n/a    arom 4 x 10: supine protraction          A:full theraband PRE should help to promote strong, painless, and balanced use long term especially over shoulder level; mild scapula dyskinesis and cuff weakness noted            P:issue remaining theraband HEP and d/c likely

## 2017-08-29 ENCOUNTER — CLINICAL SUPPORT (OUTPATIENT)
Dept: REHABILITATION | Facility: HOSPITAL | Age: 67
End: 2017-08-29
Attending: ORTHOPAEDIC SURGERY
Payer: MEDICARE

## 2017-08-29 DIAGNOSIS — R29.898 SHOULDER WEAKNESS: ICD-10-CM

## 2017-08-29 DIAGNOSIS — M25.511 RIGHT SHOULDER PAIN, UNSPECIFIED CHRONICITY: Primary | ICD-10-CM

## 2017-08-29 DIAGNOSIS — M25.611 SHOULDER STIFFNESS, RIGHT: ICD-10-CM

## 2017-08-29 PROCEDURE — 97110 THERAPEUTIC EXERCISES: CPT | Mod: PN

## 2017-08-29 PROCEDURE — G8989 SELF CARE D/C STATUS: HCPCS | Mod: CK,PN

## 2017-08-29 PROCEDURE — G8988 SELF CARE GOAL STATUS: HCPCS | Mod: CH,PN

## 2017-08-29 PROCEDURE — G8987 SELF CARE CURRENT STATUS: HCPCS | Mod: CK,PN

## 2017-08-29 PROCEDURE — 97140 MANUAL THERAPY 1/> REGIONS: CPT | Mod: PN

## 2017-08-29 NOTE — PROGRESS NOTES
Time in 2  Time out 3        Timed units  2 manual                              Time:2-230  2 therex                              Time:230-3      S:doing HEP, understands resolving scapula dyskinesis and cuff weakness imperative to discourage painful mechanics, max reduction in pain intensity and frequency since 1st day of OT  Pain: continues to decrease in intensity and frequency  O:  HEP: added purple depression and arom supine protraction    manual tx:     gh resting position traction, prom circumduction, bursal like massage abd 1 and 2, axial traction 90 abd  passive supine scaption 0-45 with ir/er  abd pain relief gh resting position traction, slide; scapulothoracic tx and subscapularis stretch not done; warm up rhythmic inf slides, abd with inf slides    prom as tolerated-pain free: n/a    stretches as tolerated-pain free: n/a    theraband 4 x 10:  gray depression    isometrics 2 x 20: n/a    education: likely tx progression    ube: n/a    arom 4 x 10: supine protraction    resisted er 0 abd (-)           A: r shoulder with excellent prom vs l side (old surgery on l reported), properly improving scapula and cuff strength important to promote correct and painless active elevation long term            P:test prom er x 3 and ir x 2 and elevation x 3    9-26-17: spoke with pt. Last week who [place]    FIM  Current g code CK min a 40-60% impairment  Goal g code CH independent 0% impairment  D/c g code CK min a 40-60% impairment

## 2017-08-30 RX ORDER — TIZANIDINE 4 MG/1
TABLET ORAL
Qty: 90 TABLET | Refills: 0 | Status: SHIPPED | OUTPATIENT
Start: 2017-08-30 | End: 2017-09-26 | Stop reason: SDUPTHER

## 2017-08-31 ENCOUNTER — OFFICE VISIT (OUTPATIENT)
Dept: ORTHOPEDICS | Facility: CLINIC | Age: 67
End: 2017-08-31
Payer: MEDICARE

## 2017-08-31 VITALS
HEART RATE: 123 BPM | HEIGHT: 72 IN | DIASTOLIC BLOOD PRESSURE: 68 MMHG | BODY MASS INDEX: 35.49 KG/M2 | SYSTOLIC BLOOD PRESSURE: 110 MMHG | WEIGHT: 262 LBS

## 2017-08-31 DIAGNOSIS — M75.31 CALCIFIC TENDINITIS OF RIGHT SHOULDER: Primary | ICD-10-CM

## 2017-08-31 PROCEDURE — 99999 PR PBB SHADOW E&M-EST. PATIENT-LVL II: CPT | Mod: PBBFAC,,, | Performed by: ORTHOPAEDIC SURGERY

## 2017-08-31 PROCEDURE — 99212 OFFICE O/P EST SF 10 MIN: CPT | Mod: PBBFAC,PN | Performed by: ORTHOPAEDIC SURGERY

## 2017-08-31 PROCEDURE — 99212 OFFICE O/P EST SF 10 MIN: CPT | Mod: S$PBB,,, | Performed by: ORTHOPAEDIC SURGERY

## 2017-08-31 NOTE — PROGRESS NOTES
This note was created using Dragon dictation software.  It occasionally misinterpreted phrases or words.      Date of surgery: May 19, 2017    Chief complaint: Right shoulder pain    Review of Systems     Musculoskeletal: see HPI    PMH, Surgical Hx, Meds, Allergies, Family History, and Social history all reviewed and correct in chart.    History of present illness: 67-year-old male who had a history of impingement and calcific tendinitis underwent subacromial decompression with acromioplasty and distal clavicle excision.  Working on strengthening particularly in forward flexion.  Has already normal strength and rotation.    Physical exam: Examination of the right shoulder shows healed surgical portals.  No erythema or drainage.  Moderate irritability.  Good range of motion.  Neurovascularly intact.    X-rays: None    Assessment: Status post right acromioplasty and distal clavicle excision    Plan: Continue the physical therapy.  Follow-up in 6 weeks.

## 2017-09-05 RX ORDER — HYDROCODONE BITARTRATE AND ACETAMINOPHEN 5; 325 MG/1; MG/1
1 TABLET ORAL EVERY 6 HOURS PRN
Qty: 40 TABLET | Refills: 0 | Status: SHIPPED | OUTPATIENT
Start: 2017-09-05 | End: 2017-09-14 | Stop reason: SDUPTHER

## 2017-09-05 NOTE — TELEPHONE ENCOUNTER
----- Message from RT Jesús sent at 9/5/2017  8:03 AM CDT -----  Contact: Pt    Pt , requesting medication refill: Pain medicine, thanks.

## 2017-09-08 RX ORDER — GABAPENTIN 100 MG/1
CAPSULE ORAL
Qty: 540 CAPSULE | Refills: 0 | Status: SHIPPED | OUTPATIENT
Start: 2017-09-08 | End: 2018-03-13 | Stop reason: SDUPTHER

## 2017-09-11 ENCOUNTER — HOSPITAL ENCOUNTER (OUTPATIENT)
Facility: HOSPITAL | Age: 67
Discharge: HOME | End: 2017-09-11
Attending: INTERNAL MEDICINE | Admitting: INTERNAL MEDICINE
Payer: MEDICARE

## 2017-09-11 ENCOUNTER — SURGERY (OUTPATIENT)
Age: 67
End: 2017-09-11

## 2017-09-11 ENCOUNTER — ANESTHESIA (OUTPATIENT)
Dept: ENDOSCOPY | Facility: HOSPITAL | Age: 67
End: 2017-09-11
Payer: MEDICARE

## 2017-09-11 ENCOUNTER — ANESTHESIA EVENT (OUTPATIENT)
Dept: ENDOSCOPY | Facility: HOSPITAL | Age: 67
End: 2017-09-11
Payer: MEDICARE

## 2017-09-11 VITALS — RESPIRATION RATE: 17 BRPM

## 2017-09-11 VITALS
DIASTOLIC BLOOD PRESSURE: 76 MMHG | WEIGHT: 262 LBS | HEART RATE: 89 BPM | OXYGEN SATURATION: 98 % | HEIGHT: 72 IN | RESPIRATION RATE: 19 BRPM | TEMPERATURE: 98 F | BODY MASS INDEX: 35.49 KG/M2 | SYSTOLIC BLOOD PRESSURE: 144 MMHG

## 2017-09-11 DIAGNOSIS — K64.8 INTERNAL HEMORRHOIDS: ICD-10-CM

## 2017-09-11 DIAGNOSIS — K63.5 POLYP OF COLON, UNSPECIFIED PART OF COLON, UNSPECIFIED TYPE: Primary | ICD-10-CM

## 2017-09-11 DIAGNOSIS — K57.30 DIVERTICULOSIS OF LARGE INTESTINE WITHOUT HEMORRHAGE: ICD-10-CM

## 2017-09-11 DIAGNOSIS — Z86.010 HX OF COLONIC POLYPS: ICD-10-CM

## 2017-09-11 PROCEDURE — 37000009 HC ANESTHESIA EA ADD 15 MINS: Performed by: INTERNAL MEDICINE

## 2017-09-11 PROCEDURE — 63600175 PHARM REV CODE 636 W HCPCS: Performed by: NURSE ANESTHETIST, CERTIFIED REGISTERED

## 2017-09-11 PROCEDURE — 88305 TISSUE EXAM BY PATHOLOGIST: CPT | Performed by: PATHOLOGY

## 2017-09-11 PROCEDURE — 37000008 HC ANESTHESIA 1ST 15 MINUTES: Performed by: INTERNAL MEDICINE

## 2017-09-11 PROCEDURE — D9220A PRA ANESTHESIA: Mod: PT,CRNA,, | Performed by: NURSE ANESTHETIST, CERTIFIED REGISTERED

## 2017-09-11 PROCEDURE — 27201089 HC SNARE, DISP (ANY): Performed by: INTERNAL MEDICINE

## 2017-09-11 PROCEDURE — 45380 COLONOSCOPY AND BIOPSY: CPT | Performed by: INTERNAL MEDICINE

## 2017-09-11 PROCEDURE — 45385 COLONOSCOPY W/LESION REMOVAL: CPT | Mod: PT,,, | Performed by: INTERNAL MEDICINE

## 2017-09-11 PROCEDURE — 27201012 HC FORCEPS, HOT/COLD, DISP: Performed by: INTERNAL MEDICINE

## 2017-09-11 PROCEDURE — 25000003 PHARM REV CODE 250: Performed by: INTERNAL MEDICINE

## 2017-09-11 PROCEDURE — 45385 COLONOSCOPY W/LESION REMOVAL: CPT | Performed by: INTERNAL MEDICINE

## 2017-09-11 PROCEDURE — 45380 COLONOSCOPY AND BIOPSY: CPT | Mod: 59,,, | Performed by: INTERNAL MEDICINE

## 2017-09-11 PROCEDURE — D9220A PRA ANESTHESIA: Mod: PT,ANES,, | Performed by: ANESTHESIOLOGY

## 2017-09-11 RX ORDER — PROPOFOL 10 MG/ML
VIAL (ML) INTRAVENOUS
Status: DISCONTINUED | OUTPATIENT
Start: 2017-09-11 | End: 2017-09-11

## 2017-09-11 RX ORDER — SODIUM CHLORIDE 9 MG/ML
INJECTION, SOLUTION INTRAVENOUS CONTINUOUS
Status: DISCONTINUED | OUTPATIENT
Start: 2017-09-11 | End: 2017-09-11 | Stop reason: HOSPADM

## 2017-09-11 RX ADMIN — PROPOFOL 50 MG: 10 INJECTION, EMULSION INTRAVENOUS at 07:09

## 2017-09-11 RX ADMIN — PROPOFOL 30 MG: 10 INJECTION, EMULSION INTRAVENOUS at 07:09

## 2017-09-11 RX ADMIN — PROPOFOL 100 MG: 10 INJECTION, EMULSION INTRAVENOUS at 07:09

## 2017-09-11 RX ADMIN — SODIUM CHLORIDE: 0.9 INJECTION, SOLUTION INTRAVENOUS at 07:09

## 2017-09-11 NOTE — ANESTHESIA PREPROCEDURE EVALUATION
09/11/2017  Jorge Gipson is a 67 y.o., male.    Pre-op Assessment    I have reviewed the Patient Summary Reports.     I have reviewed the Nursing Notes.   I have reviewed the Medications.     Review of Systems  Anesthesia Hx:  PONV Denies Family Hx of Anesthesia complications.    Cardiovascular:  Cardiovascular Normal  ECG has been reviewed.    Pulmonary:   Asthma mild and moderate Sleep Apnea    Hepatic/GI:   GERD, well controlled    Musculoskeletal:   Arthritis     Endocrine:  Endocrine Normal    Psych:   Psychiatric History          Physical Exam  General:  Obesity    Airway/Jaw/Neck:  Airway Findings: Mouth Opening: Normal Tongue: Normal  General Airway Assessment: Adult  Mallampati: III  TM Distance: Normal, at least 6 cm  Jaw/Neck Findings:  Neck ROM: Extension Decreased, Mild      Dental:  Dental Findings:   Chest/Lungs:  Chest/Lungs Clear    Heart/Vascular:  Heart Findings: Normal            Anesthesia Plan  Type of Anesthesia, risks & benefits discussed:  Anesthesia Type:  general  Patient's Preference:   Intra-op Monitoring Plan: standard ASA monitors  Intra-op Monitoring Plan Comments:   Post Op Pain Control Plan: single-shot nerve block  Post Op Pain Control Plan Comments:   Induction:   IV  Beta Blocker:  Patient is not currently on a Beta-Blocker (No further documentation required).       Informed Consent: Patient understands risks and agrees with Anesthesia plan.  Questions answered. Anesthesia consent signed with patient.  ASA Score: 2     Day of Surgery Review of History & Physical:    H&P update referred to the surgeon.         Ready For Surgery From Anesthesia Perspective.

## 2017-09-11 NOTE — DISCHARGE INSTRUCTIONS
"Discharge Instructions: After Your Surgery/Procedure  Youve just had surgery. During surgery you were given medicine called anesthesia to keep you relaxed and free of pain. After surgery you may have some pain or nausea. This is common. Here are some tips for feeling better and getting well after surgery.     Stay on schedule with your medication.   Going home  Your doctor or nurse will show you how to take care of yourself when you go home. He or she will also answer your questions. Have an adult family member or friend drive you home.      For your safety we recommend these precaution for the first 24 hours after your procedure:  · Do not drive or use heavy equipment.  · Do not make important decisions or sign legal papers.  · Do not drink alcohol.  · Have someone stay with you, if needed. He or she can watch for problems and help keep you safe.  · Your concentration, balance, coordination, and judgement may be impaired for many hours after anesthesia.  Use caution when ambulating or standing up.     · You may feel weak and "washed out" after anesthesia and surgery.      Subtle residual effects of general anesthesia or sedation with regional / local anesthesia can last more than 24 hours.  Rest for the remainder of the day or longer if your Doctor/Surgeon has advised you to do so.  Although you may feel normal within the first 24 hours, your reflexes and mental ability may be impaired without you realizing it.  You may feel dizzy, lightheaded or sleepy for 24 hours or longer.      Be sure to go to all follow-up visits with your doctor. And rest after your surgery for as long as your doctor tells you to.  Coping with pain  If you have pain after surgery, pain medicine will help you feel better. Take it as told, before pain becomes severe. Also, ask your doctor or pharmacist about other ways to control pain. This might be with heat, ice, or relaxation. And follow any other instructions your surgeon or nurse gives " you.  Tips for taking pain medicine  To get the best relief possible, remember these points:  · Pain medicines can upset your stomach. Taking them with a little food may help.  · Most pain relievers taken by mouth need at least 20 to 30 minutes to start to work.  · Taking medicine on a schedule can help you remember to take it. Try to time your medicine so that you can take it before starting an activity. This might be before you get dressed, go for a walk, or sit down for dinner.  · Constipation is a common side effect of pain medicines. Call your doctor before taking any medicines such as laxatives or stool softeners to help ease constipation. Also ask if you should skip any foods. Drinking lots of fluids and eating foods such as fruits and vegetables that are high in fiber can also help. Remember, do not take laxatives unless your surgeon has prescribed them.  · Drinking alcohol and taking pain medicine can cause dizziness and slow your breathing. It can even be deadly. Do not drink alcohol while taking pain medicine.  · Pain medicine can make you react more slowly to things. Do not drive or run machinery while taking pain medicine.  Your health care provider may tell you to take acetaminophen to help ease your pain. Ask him or her how much you are supposed to take each day. Acetaminophen or other pain relievers may interact with your prescription medicines or other over-the-counter (OTC) drugs. Some prescription medicines have acetaminophen and other ingredients. Using both prescription and OTC acetaminophen for pain can cause you to overdose. Read the labels on your OTC medicines with care. This will help you to clearly know the list of ingredients, how much to take, and any warnings. It may also help you not take too much acetaminophen. If you have questions or do not understand the information, ask your pharmacist or health care provider to explain it to you before you take the OTC medicine.  Managing  nausea  Some people have an upset stomach after surgery. This is often because of anesthesia, pain, or pain medicine, or the stress of surgery. These tips will help you handle nausea and eat healthy foods as you get better. If you were on a special food plan before surgery, ask your doctor if you should follow it while you get better. These tips may help:  · Do not push yourself to eat. Your body will tell you when to eat and how much.  · Start off with clear liquids and soup. They are easier to digest.  · Next try semi-solid foods, such as mashed potatoes, applesauce, and gelatin, as you feel ready.  · Slowly move to solid foods. Dont eat fatty, rich, or spicy foods at first.  · Do not force yourself to have 3 large meals a day. Instead eat smaller amounts more often.  · Take pain medicines with a small amount of solid food, such as crackers or toast, to avoid nausea.     Call your surgeon if  · You still have pain an hour after taking medicine. The medicine may not be strong enough.  · You feel too sleepy, dizzy, or groggy. The medicine may be too strong.  · You have side effects like nausea, vomiting, or skin changes, such as rash, itching, or hives.       If you have obstructive sleep apnea  You were given anesthesia medicine during surgery to keep you comfortable and free of pain. After surgery, you may have more apnea spells because of this medicine and other medicines you were given. The spells may last longer than usual.   At home:  · Keep using the continuous positive airway pressure (CPAP) device when you sleep. Unless your health care provider tells you not to, use it when you sleep, day or night. CPAP is a common device used to treat obstructive sleep apnea.  · Talk with your provider before taking any pain medicine, muscle relaxants, or sedatives. Your provider will tell you about the possible dangers of taking these medicines.  © 5920-9250 [employer]. [address] [place] [address]  "[place] [address]. All rights reserved. This information is not intended as a substitute for professional medical care. Always follow your healthcare professional's instructions.  Discharge Instructions: After Your Surgery/Procedure  Youve just had surgery. During surgery you were given medicine called anesthesia to keep you relaxed and free of pain. After surgery you may have some pain or nausea. This is common. Here are some tips for feeling better and getting well after surgery.     Stay on schedule with your medication.   Going home  Your doctor or nurse will show you how to take care of yourself when you go home. He or she will also answer your questions. Have an adult family member or friend drive you home.      For your safety we recommend these precaution for the first 24 hours after your procedure:  · Do not drive or use heavy equipment.  · Do not make important decisions or sign legal papers.  · Do not drink alcohol.  · Have someone stay with you, if needed. He or she can watch for problems and help keep you safe.  · Your concentration, balance, coordination, and judgement may be impaired for many hours after anesthesia.  Use caution when ambulating or standing up.     · You may feel weak and "washed out" after anesthesia and surgery.      Subtle residual effects of general anesthesia or sedation with regional / local anesthesia can last more than 24 hours.  Rest for the remainder of the day or longer if your Doctor/Surgeon has advised you to do so.  Although you may feel normal within the first 24 hours, your reflexes and mental ability may be impaired without you realizing it.  You may feel dizzy, lightheaded or sleepy for 24 hours or longer.      Be sure to go to all follow-up visits with your doctor. And rest after your surgery for as long as your doctor tells you to.  Coping with pain  If you have pain after surgery, pain medicine will help you feel better. Take it as told, before pain becomes severe. Also, " ask your doctor or pharmacist about other ways to control pain. This might be with heat, ice, or relaxation. And follow any other instructions your surgeon or nurse gives you.  Tips for taking pain medicine  To get the best relief possible, remember these points:  · Pain medicines can upset your stomach. Taking them with a little food may help.  · Most pain relievers taken by mouth need at least 20 to 30 minutes to start to work.  · Taking medicine on a schedule can help you remember to take it. Try to time your medicine so that you can take it before starting an activity. This might be before you get dressed, go for a walk, or sit down for dinner.  · Constipation is a common side effect of pain medicines. Call your doctor before taking any medicines such as laxatives or stool softeners to help ease constipation. Also ask if you should skip any foods. Drinking lots of fluids and eating foods such as fruits and vegetables that are high in fiber can also help. Remember, do not take laxatives unless your surgeon has prescribed them.  · Drinking alcohol and taking pain medicine can cause dizziness and slow your breathing. It can even be deadly. Do not drink alcohol while taking pain medicine.  · Pain medicine can make you react more slowly to things. Do not drive or run machinery while taking pain medicine.  Your health care provider may tell you to take acetaminophen to help ease your pain. Ask him or her how much you are supposed to take each day. Acetaminophen or other pain relievers may interact with your prescription medicines or other over-the-counter (OTC) drugs. Some prescription medicines have acetaminophen and other ingredients. Using both prescription and OTC acetaminophen for pain can cause you to overdose. Read the labels on your OTC medicines with care. This will help you to clearly know the list of ingredients, how much to take, and any warnings. It may also help you not take too much acetaminophen. If  you have questions or do not understand the information, ask your pharmacist or health care provider to explain it to you before you take the OTC medicine.  Managing nausea  Some people have an upset stomach after surgery. This is often because of anesthesia, pain, or pain medicine, or the stress of surgery. These tips will help you handle nausea and eat healthy foods as you get better. If you were on a special food plan before surgery, ask your doctor if you should follow it while you get better. These tips may help:  · Do not push yourself to eat. Your body will tell you when to eat and how much.  · Start off with clear liquids and soup. They are easier to digest.  · Next try semi-solid foods, such as mashed potatoes, applesauce, and gelatin, as you feel ready.  · Slowly move to solid foods. Dont eat fatty, rich, or spicy foods at first.  · Do not force yourself to have 3 large meals a day. Instead eat smaller amounts more often.  · Take pain medicines with a small amount of solid food, such as crackers or toast, to avoid nausea.     Call your surgeon if  · You still have pain an hour after taking medicine. The medicine may not be strong enough.  · You feel too sleepy, dizzy, or groggy. The medicine may be too strong.  · You have side effects like nausea, vomiting, or skin changes, such as rash, itching, or hives.       If you have obstructive sleep apnea  You were given anesthesia medicine during surgery to keep you comfortable and free of pain. After surgery, you may have more apnea spells because of this medicine and other medicines you were given. The spells may last longer than usual.   At home:  · Keep using the continuous positive airway pressure (CPAP) device when you sleep. Unless your health care provider tells you not to, use it when you sleep, day or night. CPAP is a common device used to treat obstructive sleep apnea.  · Talk with your provider before taking any pain medicine, muscle relaxants, or  sedatives. Your provider will tell you about the possible dangers of taking these medicines.  © 5374-4139 [employer]. [address] [place] [address], [place] [address]. All rights reserved. This information is not intended as a substitute for professional medical care. Always follow your healthcare professional's instructions.    Eating a High-Fiber Diet  Fiber is what gives strength and structure to plants. Most grains, beans, vegetables, and fruits contain fiber. Foods rich in fiber are often low in calories and fat, and they fill you up more. They may also reduce your risks for certain health problems. To find out the amount of fiber in canned, packaged, or frozen foods, read the Nutrition Facts label. It tells you how much fiber is in a serving.    Types of fiber and their benefits  There are two types of fiber: insoluble and soluble. They both aid digestion and help you maintain a healthy weight.  · Insoluble fiber. This is found in whole grains, cereals, certain fruits and vegetables such as apple skin, corn, and carrots. Insoluble fiber may prevent constipation and reduce the risk for certain types of cancer.  · Soluble fiber. This type of fiber is in oats, beans, and certain fruits and vegetables such as strawberries and peas. Soluble fiber can reduce cholesterol, which may help lower the risk for heart disease. It also helps control blood sugar levels.  Look for high-fiber foods  Try these foods to add fiber to your diet:  · Whole-grain breads and cereals. Try to eat 6 to 8 ounces a day. Include wheat and oat bran cereals, whole-wheat muffins or toast, and corn tortillas in your meals.  · Fruits. Try to eat 2 cups a day. Apples, oranges, strawberries, pears, and bananas are good sources. (Note: Fruit juice is low in fiber.)  · Vegetables. Try to eat at least 2.5 cups a day. Add asparagus, carrots, broccoli, peas, and corn to your meals.  · Beans. One cup of cooked lentils gives you over 15 grams of  fiber. Try navy beans, lentils, and chickpeas.  · Seeds. A small handful of seeds gives you about 3 grams of fiber. Try sunflower seeds.  Keep track of your fiber  Keep track of how much fiber you eat. Start by reading food labels. Then eat a variety of foods high in fiber. As you begin to eat more fiber, ask your healthcare provider how much water you should be drinking to keep your digestive system working smoothly.  You should aim for a certain amount of fiber in your diet each day. If you are a woman, that amount is between 25 and 28 grams per day. Men should aim for 30 to 33 grams per day. After age 50, your daily fiber needs drop to 22 grams for women and 28 grams for men.  Before you reach for the fiber supplements, think about this. Fiber is found naturally in healthy whole foods. It gives you that feeling of fullness after you eat. Taking fiber supplements or eating fiber-enriched foods will not give you this full feeling.  Your fiber intake is a good measure for the quality of your overall diet. If you are missing out on your daily amount of fiber, you may be lacking other important nutrients as well.  Date Last Reviewed: 5/11/2015 © 2000-2017 [employer]. [address] [place] [address], [place] [address]. All rights reserved. This information is not intended as a substitute for professional medical care. Always follow your healthcare professional's instructions.        Hemorrhoids    Hemorrhoids are swollen and inflamed veins inside the rectum and near the anus. The rectum is the last several inches of the colon. The anus is the passage between the rectum and the outside of the body.  Causes  The veins can become swollen due to increased pressure in them. This is most often caused by:  · Chronic constipation or diarrhea  · Straining when having a bowel movement  · Sitting too long on the toilet  · A low-fiber diet  · Pregnancy  Symptoms  · Bleeding from the rectum (this may be noticeable after  bowel movements)  · Lump near the anus  · Itching around the anus  · Pain around the anus  There are different types of hemorrhoids. Depending on the type you have and the severity, you may be able to treat yourself at home. In some cases, a procedure may be the best treatment option. Your healthcare provider can tell you more about this, if needed.  Home care  General care  · To get relief from pain or itching, try:  ¨ Topical products. Your healthcare provider may prescribe or recommend creams, ointments, or pads that can be applied to the hemorrhoid. Use these exactly as directed.  ¨ Medicines. Your healthcare provider may recommend stool softeners, suppositories, or laxatives to help manage constipation. Use these exactly as directed.  ¨ Sitz baths. A sitz bath involves sitting in a few inches of warm bath water. Be careful not to make the water so hot that you burn yourself--test it before sitting in it. Soak for about 10 to 15 minutes a few times a day. This may help relieve pain.  Tips to help prevent hemorrhoids  · Eat more fiber. Fiber adds bulk to stool and absorbs water as it moves through your colon. This makes stool softer and easier to pass.  ¨ Increase the fiber in your diet with more fiber-rich foods. These include fresh fruit, vegetables, and whole grains.  ¨ Take a fiber supplement or bulking agent, if advised to by your provider. These include products such as psyllium or methylcellulose.  · Drink plenty of water, if directed to by your provider. This can help keep stool soft.  · Be more active. Frequent exercise aids digestion and helps prevent constipation. It may also help make bowel movements more regular.  · Dont strain during bowel movements. This can make hemorrhoids more likely. Also, dont sit on the toilet for long periods of time.  Follow-up care  Follow up with your healthcare provider, or as advised. If a culture or imaging tests were done, you will be notified of the results when  they are ready. This may take a few days or longer.  When to seek medical advice  Call your healthcare provider right away if any of these occur:  · Increased bleeding from the rectum  · Increased pain around the rectum or anus  · Weakness or dizziness  Call 911  Call 911 or return to the emergency department right away if any of these occur:  · Trouble breathing or swallowing  · Fainting or loss of consciousness  · Unusually fast heart rate  · Vomiting blood  · Large amounts of blood in stool  Date Last Reviewed: 6/22/2015  © 9502-6379 [employer]. [address] [place] [address], [place] [address]. All rights reserved. This information is not intended as a substitute for professional medical care. Always follow your healthcare professional's instructions.        Diverticulosis    Diverticulosis means that small pouches have formed in the wall of your large intestine (colon). Most often, this problem causes no symptoms and is common as people age. But the pouches in the colon are at risk of becoming infected. When this happens, the condition is called diverticulitis. Although most people with diverticulosis never develop diverticulitis, it is still not uncommon. Rectal bleeding can also occur and in less common situations, a type of colon inflammation called colitis.  While most people do not have symptoms, some people with diverticulosis may have:  · Abdominal cramps and pain  · Bloating  · Constipation  · Change in bowel habits  Causes  The exact cause of diverticulosis (and diverticulitis) has not been proved, but a few things are associated with the condition:  · Low-fiber diet  · Constipation  · Lack of exercise  Your healthcare provider will talk with you about how to manage your condition. Diet changes may be all that are needed to help control diverticulosis and prevent progression to diverticulitis. If you develop diverticulitis, you will likely need other treatments.  Home care  You may be told to  take fiber supplements daily. Fiber adds bulk to the stool so that it passes through the colon more easily. Stool softeners may be recommended. You may also be given medications for pain relief. Be sure to take all medications as directed.  In the past, people were told to avoid corn, nuts, and seeds. This is no longer necessary.  Follow these guidelines when caring for yourself at home:  · Eat unprocessed foods that are high in fiber. Whole grains, fruits, and vegetables are good choices.  · Drink 6 to 8 glasses of water every day unless your healthcare provider has you limit how much fluid you should have.  · Watch for changes in your bowel movements. Tell your provider if you notice any changes.  · Begin an exercise program. Ask your provider how to get started. Generally, walking is the best.  · Get plenty of rest and sleep.  Follow-up care  Follow up with your healthcare provider, or as advised. Regular visits may be needed to check on your health. Sometimes special procedures such as colonoscopy, are needed after an episode of diverticulitis or blooding. Be sure to keep all your appointments.  If a stool sample was taken, or cultures were done, you should be told if they are positive, or if your treatment needs to be changed. You can call as directed for the results.  If X-rays were done, a radiologist will look at them. You will be told if there is a change in your treatment.  If antibiotics were prescribed, be sure to finish them all.  When to seek medical advice  Call your healthcare provider right away if any of these occur:  · Fever of 100.4°F (38°C) or higher, or as directed by your healthcare provider  · Severe cramps in the lower left side of the abdomen or pain that is getting worse  · Tenderness in the lower left side of the abdomen or worsening pain throughout the abdomen  · Diarrhea or constipation that doesn't get better within 24 hours  · Nausea and vomiting  · Bleeding from the rectum  Call  911  Call emergency services if any of the following occur:  · Trouble breathing  · Confusion  · Very drowsy or trouble awakening  · Fainting or loss of consciousness  · Rapid heart rate  · Chest pain  Date Last Reviewed: 12/30/2015 © 2000-2017 [employer]. [address] [place] [address]. All rights reserved. This information is not intended as a substitute for professional medical care. Always follow your healthcare professional's instructions.        Understanding Colon and Rectal Polyps    The colon (also called the large intestine) is a muscular tube that forms the last part of the digestive tract. It absorbs water and stores food waste. The colon is about 4 to 6 feet long. The rectum is the last 6 inches of the colon. The colon and rectum have a smooth lining composed of millions of cells. Changes in these cells can lead to growths in the colon that can become cancerous and should be removed. Multiple tests are available to screen for colon cancer, but the colonoscopy is the most recommended test. During colonoscopy, these polyps can be removed. How often you need this test depends on many things including your condition, your family history, symptoms, and what the findings were at the previous colonoscopy.   When the colon lining changes  Changes that happen in the cells that line the colon or rectum can lead to growths called polyps. Over a period of years, polyps can turn cancerous. Removing polyps early may prevent cancer from ever forming.  Polyps  Polyps are fleshy clumps of tissue that form on the lining of the colon or rectum. Small polyps are usually benign (not cancerous). However, over time, cells in a polyp can change and become cancerous. Certain types of polyps known as adenomatous polyps are premalignant. The risk for invasive cancer increases with the size of the polyp and certain cell and gene features. This means that they can become cancerous if they're not  removed. Hyperplastic polyps are benign. They can grow quite large and not turn cancerous.   Cancer  Almost all colorectal cancers start when polyp cells begin growing abnormally. As a cancerous tumor grows, it may involve more and more of the colon or rectum. In time, cancer can also grow beyond the colon or rectum and spread to nearby organs or to glands called lymph nodes. The cells can also travel to other parts of the body. This is known as metastasis. The earlier a cancerous tumor is removed, the better the chance of preventing its spread.    Date Last Reviewed: 8/1/2016  © 6172-5337 [employer]. [address] [place] [id number]. All rights reserved. This information is not intended as a substitute for professional medical care. Always follow your healthcare professional's instructions.

## 2017-09-11 NOTE — ANESTHESIA POSTPROCEDURE EVALUATION
Anesthesia Post Evaluation    Patient: Jorge Gipson    Procedure(s) Performed: Procedure(s) (LRB):  COLONOSCOPY (N/A)    Final Anesthesia Type: general  Patient location during evaluation: PACU  Patient participation: Yes- Able to Participate  Level of consciousness: awake and alert  Post-procedure vital signs: reviewed and stable  Pain management: adequate  Airway patency: patent  PONV status at discharge: No PONV  Anesthetic complications: no      Cardiovascular status: blood pressure returned to baseline  Respiratory status: unassisted  Hydration status: euvolemic  Follow-up not needed.        Visit Vitals  BP (!) 144/76   Pulse 89   Temp 36.8 °C (98.2 °F) (Oral)   Resp 19   Ht 6' (1.829 m)   Wt 118.8 kg (262 lb)   SpO2 98%   BMI 35.53 kg/m²       Pain/Mei Score: Pain Assessment Performed: Yes (9/11/2017  7:51 AM)  Presence of Pain: denies (9/11/2017  8:20 AM)  Mei Score: 10 (9/11/2017  8:20 AM)

## 2017-09-11 NOTE — TRANSFER OF CARE
Anesthesia Transfer of Care Note    Patient: Jorge Gipson    Procedure(s) Performed: Procedure(s) (LRB):  COLONOSCOPY (N/A)    Patient location: GI    Anesthesia Type: general    Transport from OR: Transported from OR on room air with adequate spontaneous ventilation    Post pain: adequate analgesia    Post assessment: no apparent anesthetic complications    Post vital signs: stable    Level of consciousness: awake    Nausea/Vomiting: no nausea/vomiting    Complications: none    Transfer of care protocol was followed      Last vitals:   Visit Vitals  /83   Pulse 97   Temp 36.3 °C (97.4 °F) (Oral)   Resp 18   Ht 6' (1.829 m)   Wt 118.8 kg (262 lb)   SpO2 95%   BMI 35.53 kg/m²

## 2017-09-14 RX ORDER — HYDROCODONE BITARTRATE AND ACETAMINOPHEN 5; 325 MG/1; MG/1
1 TABLET ORAL EVERY 6 HOURS PRN
Qty: 40 TABLET | Refills: 0 | Status: SHIPPED | OUTPATIENT
Start: 2017-09-14

## 2017-09-14 NOTE — TELEPHONE ENCOUNTER
----- Message from Jelly Keene sent at 9/14/2017  8:02 AM CDT -----  Contact: self  Patient requesting refill on Hydrocodone 5 mg called to [facility] on [address]   If any questions please call    Thanks

## 2017-09-15 ENCOUNTER — TELEPHONE (OUTPATIENT)
Dept: GASTROENTEROLOGY | Facility: CLINIC | Age: 67
End: 2017-09-15

## 2017-09-15 RX ORDER — OMEPRAZOLE 40 MG/1
CAPSULE, DELAYED RELEASE ORAL
Qty: 90 CAPSULE | Refills: 3 | Status: SHIPPED | OUTPATIENT
Start: 2017-09-15

## 2017-09-15 NOTE — TELEPHONE ENCOUNTER
Hyperplastic polyps only on this exam but adenomas on prior.  Repeat in 5 years, not 3 as previously documented.

## 2017-09-27 RX ORDER — TIZANIDINE 4 MG/1
TABLET ORAL
Qty: 90 TABLET | Refills: 0 | Status: SHIPPED | OUTPATIENT
Start: 2017-09-27 | End: 2017-11-02 | Stop reason: SDUPTHER

## 2017-10-03 DIAGNOSIS — Z13.6 SCREENING FOR AAA (ABDOMINAL AORTIC ANEURYSM): Primary | ICD-10-CM

## 2017-10-04 ENCOUNTER — DOCUMENTATION ONLY (OUTPATIENT)
Dept: FAMILY MEDICINE | Facility: CLINIC | Age: 67
End: 2017-10-04

## 2017-10-04 NOTE — PROGRESS NOTES
Pre-Visit Chart Review  For Appointment Scheduled on 10/05/2017    Health Maintenance Due   Topic Date Due    Abdominal Aortic Aneurysm Screening  01/29/2017    Influenza Vaccine  08/01/2017    Lipid Panel  09/13/2017

## 2017-11-02 RX ORDER — TIZANIDINE 4 MG/1
TABLET ORAL
Qty: 90 TABLET | Refills: 0 | Status: SHIPPED | OUTPATIENT
Start: 2017-11-02 | End: 2017-12-24 | Stop reason: SDUPTHER

## 2017-11-29 ENCOUNTER — HOSPITAL ENCOUNTER (EMERGENCY)
Dept: HOSPITAL 15 - ER | Age: 67
LOS: 1 days | Discharge: LEFT BEFORE DISCHARGE | End: 2017-11-30
Payer: MEDICARE

## 2017-11-29 VITALS — SYSTOLIC BLOOD PRESSURE: 116 MMHG | DIASTOLIC BLOOD PRESSURE: 95 MMHG

## 2017-11-29 VITALS — HEIGHT: 72 IN | WEIGHT: 245 LBS | BODY MASS INDEX: 33.18 KG/M2

## 2017-11-29 DIAGNOSIS — R07.9: Primary | ICD-10-CM

## 2017-11-29 DIAGNOSIS — R11.0: ICD-10-CM

## 2017-11-29 DIAGNOSIS — R10.9: ICD-10-CM

## 2017-11-29 DIAGNOSIS — Z53.21: ICD-10-CM

## 2017-11-29 LAB
ALBUMIN SERPL-MCNC: 4.3 G/DL (ref 3.4–5)
ALP SERPL-CCNC: 92 U/L (ref 45–117)
AMYLASE SERPL-CCNC: 42 U/L (ref 25–115)
ANION GAP SERPL CALCULATED.3IONS-SCNC: 16 MMOL/L (ref 5–15)
APTT PPP: 22.4 SEC (ref 22.64–33.71)
BILIRUB SERPL-MCNC: 0.6 MG/DL (ref 0.2–1)
BNP SERPL-MCNC: 24.99 PG/ML (ref 0–100)
BUN SERPL-MCNC: 18 MG/DL (ref 7–18)
BUN/CREAT SERPL: 15.7
CALCIUM SERPL-MCNC: 9.5 MG/DL (ref 8.5–10.1)
CHLORIDE SERPL-SCNC: 105 MMOL/L (ref 98–107)
CO2 SERPL-SCNC: 22 MMOL/L (ref 21–32)
GLUCOSE SERPL-MCNC: 155 MG/DL (ref 74–106)
HCT VFR BLD AUTO: 46.8 % (ref 41–53)
HGB BLD-MCNC: 15.9 G/DL (ref 13.5–17.5)
INR PPP: 0.96 (ref 0.9–1.15)
MAGNESIUM SERPL-MCNC: 2.2 MG/DL (ref 1.6–2.6)
MCH RBC QN AUTO: 32.8 PG (ref 28–32)
MCV RBC AUTO: 96.4 FL (ref 80–100)
NRBC BLD QL AUTO: 0 %
POTASSIUM SERPL-SCNC: 4 MMOL/L (ref 3.5–5.1)
PROT SERPL-MCNC: 8.1 G/DL (ref 6.4–8.2)
PROTHROMBIN TIME: 10.5 SEC (ref 9.37–12.3)
SODIUM SERPL-SCNC: 143 MMOL/L (ref 136–145)
TEMPERATURE:: 22 C (ref 20–25)

## 2017-11-29 PROCEDURE — 83880 ASSAY OF NATRIURETIC PEPTIDE: CPT

## 2017-11-29 PROCEDURE — 83690 ASSAY OF LIPASE: CPT

## 2017-11-29 PROCEDURE — 85610 PROTHROMBIN TIME: CPT

## 2017-11-29 PROCEDURE — 85730 THROMBOPLASTIN TIME PARTIAL: CPT

## 2017-11-29 PROCEDURE — 82150 ASSAY OF AMYLASE: CPT

## 2017-11-29 PROCEDURE — 93005 ELECTROCARDIOGRAM TRACING: CPT

## 2017-11-29 PROCEDURE — 80053 COMPREHEN METABOLIC PANEL: CPT

## 2017-11-29 PROCEDURE — 36415 COLL VENOUS BLD VENIPUNCTURE: CPT

## 2017-11-29 PROCEDURE — 85025 COMPLETE CBC W/AUTO DIFF WBC: CPT

## 2017-11-29 PROCEDURE — 83735 ASSAY OF MAGNESIUM: CPT

## 2017-11-29 PROCEDURE — 84484 ASSAY OF TROPONIN QUANT: CPT

## 2017-12-24 RX ORDER — TIZANIDINE 4 MG/1
TABLET ORAL
Qty: 90 TABLET | Refills: 0 | Status: SHIPPED | OUTPATIENT
Start: 2017-12-24 | End: 2018-02-04 | Stop reason: SDUPTHER

## 2018-01-16 DIAGNOSIS — R10.9 ABDOMINAL CRAMPING: ICD-10-CM

## 2018-01-16 RX ORDER — ALBUTEROL SULFATE 90 UG/1
2 AEROSOL, METERED RESPIRATORY (INHALATION) EVERY 4 HOURS PRN
Qty: 18 G | Refills: 0 | Status: CANCELLED | OUTPATIENT
Start: 2018-01-16

## 2018-01-16 RX ORDER — DICYCLOMINE HYDROCHLORIDE 10 MG/1
10 CAPSULE ORAL 3 TIMES DAILY
Qty: 90 CAPSULE | Refills: 0 | Status: CANCELLED | OUTPATIENT
Start: 2018-01-16

## 2018-01-16 NOTE — TELEPHONE ENCOUNTER
----- Message from Vance Javed sent at 1/16/2018 11:22 AM CST -----  Contact: pt  Pt is requesting a refill on his medications, he is currently our of town and would like to have them filled were he is:  albuterol (PROAIR HFA) 90 mcg/actuation inhaler  dicyclomine (BENTYL) 10 MG capsule   Call Back#[phone]  Thanks    [facility] in [facility]-

## 2018-01-16 NOTE — TELEPHONE ENCOUNTER
lov: 8/1/17  labs: 8/1/17  refill: Pro 5/23/17  Bentyl 8/3/17    Patient out [place] currently with no follow up appointments. Please advise.

## 2018-01-18 DIAGNOSIS — R10.9 ABDOMINAL CRAMPING: ICD-10-CM

## 2018-01-18 DIAGNOSIS — J45.909 ASTHMA, UNSPECIFIED ASTHMA SEVERITY, UNSPECIFIED WHETHER COMPLICATED, UNSPECIFIED WHETHER PERSISTENT: Primary | ICD-10-CM

## 2018-01-18 RX ORDER — DICYCLOMINE HYDROCHLORIDE 10 MG/1
10 CAPSULE ORAL 3 TIMES DAILY
Qty: 90 CAPSULE | Refills: 0 | Status: SHIPPED | OUTPATIENT
Start: 2018-01-18

## 2018-01-18 RX ORDER — ALBUTEROL SULFATE 90 UG/1
2 AEROSOL, METERED RESPIRATORY (INHALATION) EVERY 4 HOURS PRN
Qty: 18 G | Refills: 0 | Status: SHIPPED | OUTPATIENT
Start: 2018-01-18

## 2018-01-18 NOTE — TELEPHONE ENCOUNTER
Medication refill request Bentyl 10mg, Proair inhaler  Last visit: 8/1/17  Follow up: none on file    ----- Message from Myriam Bowens sent at 1/18/2018  8:48 AM CST -----  Contact: Patient  Jorge, patient [phone], Calling to get refills on Rx dicyclomine (BENTYL) 10 MG capsule and Rx albuterol (PROAIR HFA) 90 mcg/actuation inhaler.  Please send to [facility]. Please advise. Thanks.  [facility] [address] [place] [address]  Phone: [phone] Fax: [phone]

## 2018-02-05 RX ORDER — TIZANIDINE 4 MG/1
TABLET ORAL
Qty: 90 TABLET | Refills: 0 | Status: SHIPPED | OUTPATIENT
Start: 2018-02-05

## 2018-02-22 ENCOUNTER — TELEPHONE (OUTPATIENT)
Dept: FAMILY MEDICINE | Facility: CLINIC | Age: 68
End: 2018-02-22

## 2018-02-22 NOTE — TELEPHONE ENCOUNTER
----- Message from Garciaronaldo Jamel sent at 2018 11:13 AM CST -----  Contact: self   Patient need a refill for rx gabapentin (NEURONTIN) 100 MG capsule, and rosuvastatin (CRESTOR) 10 MG tablet (). Please send to [facility] in [place]. Please call back at [phone] (home)          [facility] [address] [place] [address]  Phone: [phone] Fax: [phone]

## 2018-02-22 NOTE — TELEPHONE ENCOUNTER
Returned patient call to advise an appointment is required for refills. No answer. Unable to leave a message

## 2018-02-23 DIAGNOSIS — F32.A DEPRESSION, UNSPECIFIED DEPRESSION TYPE: ICD-10-CM

## 2018-02-23 DIAGNOSIS — E78.5 HYPERLIPIDEMIA, UNSPECIFIED HYPERLIPIDEMIA TYPE: ICD-10-CM

## 2018-02-23 NOTE — TELEPHONE ENCOUNTER
Please advise... Mr Gipson is requesting medication refills. I advised him an office visit is required due to him not being seen since August. Patient stated he is currently in [place] and is almost out of medications

## 2018-02-23 NOTE — TELEPHONE ENCOUNTER
----- Message from Inna Chaparro sent at 2/23/2018 12:47 PM CST -----  Contact: self  Patient called regarding a refill of medications, left a message previously, no response. Please contact [phone] (home)     rosuvastatin (CRESTOR) 10 MG tablet   gabapentin (NEURONTIN) 100 MG capsule  escitalopram oxalate (LEXAPRO) 20 MG tablet      [facility] [address] [place] [address]  Phone: [phone] Fax: [phone]

## 2018-03-15 RX ORDER — GABAPENTIN 100 MG/1
200 CAPSULE ORAL 3 TIMES DAILY
Qty: 540 CAPSULE | Refills: 0 | Status: SHIPPED | OUTPATIENT
Start: 2018-03-15

## 2018-03-15 RX ORDER — ROSUVASTATIN CALCIUM 10 MG/1
10 TABLET, COATED ORAL DAILY
Qty: 90 TABLET | Refills: 0 | Status: SHIPPED | OUTPATIENT
Start: 2018-03-15 | End: 2019-03-15

## 2018-03-15 RX ORDER — ESCITALOPRAM OXALATE 20 MG/1
20 TABLET ORAL DAILY
Qty: 90 TABLET | Refills: 0 | Status: SHIPPED | OUTPATIENT
Start: 2018-03-15 | End: 2019-03-15

## 2018-07-27 ENCOUNTER — HOSPITAL ENCOUNTER (INPATIENT)
Dept: HOSPITAL 15 - ER | Age: 68
LOS: 6 days | Discharge: HOME | DRG: 872 | End: 2018-08-02
Attending: FAMILY MEDICINE | Admitting: INTERNAL MEDICINE
Payer: MEDICARE

## 2018-07-27 VITALS — HEIGHT: 72 IN | WEIGHT: 210.54 LBS | BODY MASS INDEX: 28.52 KG/M2

## 2018-07-27 DIAGNOSIS — A41.9: Primary | ICD-10-CM

## 2018-07-27 DIAGNOSIS — E78.5: ICD-10-CM

## 2018-07-27 DIAGNOSIS — E78.00: ICD-10-CM

## 2018-07-27 DIAGNOSIS — Z80.8: ICD-10-CM

## 2018-07-27 DIAGNOSIS — R45.851: ICD-10-CM

## 2018-07-27 DIAGNOSIS — Z83.3: ICD-10-CM

## 2018-07-27 DIAGNOSIS — K63.89: ICD-10-CM

## 2018-07-27 DIAGNOSIS — K57.32: ICD-10-CM

## 2018-07-27 DIAGNOSIS — Z82.49: ICD-10-CM

## 2018-07-27 DIAGNOSIS — G89.29: ICD-10-CM

## 2018-07-27 DIAGNOSIS — Z88.8: ICD-10-CM

## 2018-07-27 DIAGNOSIS — E86.0: ICD-10-CM

## 2018-07-27 DIAGNOSIS — K44.9: ICD-10-CM

## 2018-07-27 DIAGNOSIS — K29.70: ICD-10-CM

## 2018-07-27 PROCEDURE — 83690 ASSAY OF LIPASE: CPT

## 2018-07-27 PROCEDURE — 81001 URINALYSIS AUTO W/SCOPE: CPT

## 2018-07-27 PROCEDURE — 93005 ELECTROCARDIOGRAM TRACING: CPT

## 2018-07-27 PROCEDURE — 96375 TX/PRO/DX INJ NEW DRUG ADDON: CPT

## 2018-07-27 PROCEDURE — 82962 GLUCOSE BLOOD TEST: CPT

## 2018-07-27 PROCEDURE — 80048 BASIC METABOLIC PNL TOTAL CA: CPT

## 2018-07-27 PROCEDURE — 86850 RBC ANTIBODY SCREEN: CPT

## 2018-07-27 PROCEDURE — 85610 PROTHROMBIN TIME: CPT

## 2018-07-27 PROCEDURE — 96365 THER/PROPH/DIAG IV INF INIT: CPT

## 2018-07-27 PROCEDURE — 85025 COMPLETE CBC W/AUTO DIFF WBC: CPT

## 2018-07-27 PROCEDURE — 82378 CARCINOEMBRYONIC ANTIGEN: CPT

## 2018-07-27 PROCEDURE — 86900 BLOOD TYPING SEROLOGIC ABO: CPT

## 2018-07-27 PROCEDURE — 86901 BLOOD TYPING SEROLOGIC RH(D): CPT

## 2018-07-27 PROCEDURE — 83605 ASSAY OF LACTIC ACID: CPT

## 2018-07-27 PROCEDURE — 84484 ASSAY OF TROPONIN QUANT: CPT

## 2018-07-27 PROCEDURE — 96376 TX/PRO/DX INJ SAME DRUG ADON: CPT

## 2018-07-27 PROCEDURE — 80053 COMPREHEN METABOLIC PANEL: CPT

## 2018-07-27 PROCEDURE — 36415 COLL VENOUS BLD VENIPUNCTURE: CPT

## 2018-07-27 PROCEDURE — 85730 THROMBOPLASTIN TIME PARTIAL: CPT

## 2018-07-27 PROCEDURE — 83735 ASSAY OF MAGNESIUM: CPT

## 2018-07-27 PROCEDURE — 83036 HEMOGLOBIN GLYCOSYLATED A1C: CPT

## 2018-07-27 PROCEDURE — 71046 X-RAY EXAM CHEST 2 VIEWS: CPT

## 2018-07-27 PROCEDURE — 87040 BLOOD CULTURE FOR BACTERIA: CPT

## 2018-07-27 PROCEDURE — 74176 CT ABD & PELVIS W/O CONTRAST: CPT

## 2018-07-27 PROCEDURE — 82150 ASSAY OF AMYLASE: CPT

## 2018-07-27 PROCEDURE — 96361 HYDRATE IV INFUSION ADD-ON: CPT

## 2018-07-27 PROCEDURE — 43239 EGD BIOPSY SINGLE/MULTIPLE: CPT

## 2018-07-28 VITALS — DIASTOLIC BLOOD PRESSURE: 89 MMHG | SYSTOLIC BLOOD PRESSURE: 138 MMHG

## 2018-07-28 VITALS — DIASTOLIC BLOOD PRESSURE: 92 MMHG | SYSTOLIC BLOOD PRESSURE: 156 MMHG

## 2018-07-28 VITALS — DIASTOLIC BLOOD PRESSURE: 85 MMHG | SYSTOLIC BLOOD PRESSURE: 145 MMHG

## 2018-07-28 LAB
ALBUMIN SERPL-MCNC: 4 G/DL (ref 3.4–5)
ALP SERPL-CCNC: 96 U/L (ref 45–117)
ALT SERPL-CCNC: 27 U/L (ref 16–61)
AMYLASE SERPL-CCNC: 42 U/L (ref 25–115)
ANION GAP SERPL CALCULATED.3IONS-SCNC: 12 MMOL/L (ref 5–15)
BILIRUB SERPL-MCNC: 0.6 MG/DL (ref 0.2–1)
BUN SERPL-MCNC: 17 MG/DL (ref 7–18)
BUN/CREAT SERPL: 15.9
CALCIUM SERPL-MCNC: 9.4 MG/DL (ref 8.5–10.1)
CHLORIDE SERPL-SCNC: 108 MMOL/L (ref 98–107)
CO2 SERPL-SCNC: 21 MMOL/L (ref 21–32)
GLUCOSE SERPL-MCNC: 187 MG/DL (ref 74–106)
HCT VFR BLD AUTO: 44 % (ref 41–53)
HGB BLD-MCNC: 15.1 G/DL (ref 13.5–17.5)
LIPASE SERPL-CCNC: 79 U/L (ref 73–393)
MAGNESIUM SERPL-MCNC: 2.3 MG/DL (ref 1.6–2.6)
MCH RBC QN AUTO: 33.5 PG (ref 28–32)
MCV RBC AUTO: 97.7 FL (ref 80–100)
NRBC BLD QL AUTO: 0 %
POTASSIUM SERPL-SCNC: 3.8 MMOL/L (ref 3.5–5.1)
PROT SERPL-MCNC: 8.2 G/DL (ref 6.4–8.2)
SODIUM SERPL-SCNC: 141 MMOL/L (ref 136–145)

## 2018-07-28 RX ADMIN — CEFTRIAXONE SODIUM SCH MLS/HR: 1 INJECTION, POWDER, FOR SOLUTION INTRAMUSCULAR; INTRAVENOUS at 10:08

## 2018-07-28 RX ADMIN — SODIUM CHLORIDE SCH MG: 9 INJECTION, SOLUTION INTRAVENOUS at 21:16

## 2018-07-28 RX ADMIN — ENOXAPARIN SODIUM SCH MG: 40 INJECTION SUBCUTANEOUS at 10:47

## 2018-07-28 RX ADMIN — SODIUM CHLORIDE SCH MLS/HR: 0.9 INJECTION, SOLUTION INTRAVENOUS at 17:13

## 2018-07-28 RX ADMIN — PROMETHAZINE HYDROCHLORIDE PRN MG: 25 INJECTION INTRAMUSCULAR; INTRAVENOUS at 17:13

## 2018-07-28 RX ADMIN — SODIUM CHLORIDE SCH MLS/HR: 0.9 INJECTION, SOLUTION INTRAVENOUS at 08:53

## 2018-07-28 RX ADMIN — MORPHINE SULFATE PRN MG: 2 INJECTION, SOLUTION INTRAMUSCULAR; INTRAVENOUS at 12:45

## 2018-07-28 RX ADMIN — SODIUM CHLORIDE SCH MG: 9 INJECTION, SOLUTION INTRAVENOUS at 10:08

## 2018-07-29 VITALS — DIASTOLIC BLOOD PRESSURE: 83 MMHG | SYSTOLIC BLOOD PRESSURE: 155 MMHG

## 2018-07-29 VITALS — DIASTOLIC BLOOD PRESSURE: 89 MMHG | SYSTOLIC BLOOD PRESSURE: 154 MMHG

## 2018-07-29 VITALS — DIASTOLIC BLOOD PRESSURE: 81 MMHG | SYSTOLIC BLOOD PRESSURE: 150 MMHG

## 2018-07-29 VITALS — SYSTOLIC BLOOD PRESSURE: 125 MMHG | DIASTOLIC BLOOD PRESSURE: 78 MMHG

## 2018-07-29 LAB
HCT VFR BLD AUTO: 37 % (ref 41–53)
HGB BLD-MCNC: 12.5 G/DL (ref 13.5–17.5)
MCH RBC QN AUTO: 33.1 PG (ref 28–32)
MCV RBC AUTO: 98.2 FL (ref 80–100)
NRBC BLD QL AUTO: 0 %

## 2018-07-29 RX ADMIN — SODIUM CHLORIDE SCH MLS/HR: 0.9 INJECTION, SOLUTION INTRAVENOUS at 04:58

## 2018-07-29 RX ADMIN — PROMETHAZINE HYDROCHLORIDE PRN MG: 25 INJECTION INTRAMUSCULAR; INTRAVENOUS at 09:59

## 2018-07-29 RX ADMIN — SODIUM CHLORIDE SCH MG: 9 INJECTION, SOLUTION INTRAVENOUS at 08:57

## 2018-07-29 RX ADMIN — CEFTRIAXONE SODIUM SCH MLS/HR: 1 INJECTION, POWDER, FOR SOLUTION INTRAMUSCULAR; INTRAVENOUS at 08:57

## 2018-07-29 RX ADMIN — SODIUM CHLORIDE SCH MG: 9 INJECTION, SOLUTION INTRAVENOUS at 22:05

## 2018-07-29 RX ADMIN — ENOXAPARIN SODIUM SCH MG: 40 INJECTION SUBCUTANEOUS at 09:43

## 2018-07-29 RX ADMIN — SODIUM CHLORIDE SCH MLS/HR: 0.9 INJECTION, SOLUTION INTRAVENOUS at 14:22

## 2018-07-29 RX ADMIN — MORPHINE SULFATE PRN MG: 2 INJECTION, SOLUTION INTRAMUSCULAR; INTRAVENOUS at 08:57

## 2018-07-30 VITALS — SYSTOLIC BLOOD PRESSURE: 125 MMHG | DIASTOLIC BLOOD PRESSURE: 75 MMHG

## 2018-07-30 VITALS — SYSTOLIC BLOOD PRESSURE: 129 MMHG | DIASTOLIC BLOOD PRESSURE: 83 MMHG

## 2018-07-30 VITALS — DIASTOLIC BLOOD PRESSURE: 96 MMHG | SYSTOLIC BLOOD PRESSURE: 147 MMHG

## 2018-07-30 VITALS — SYSTOLIC BLOOD PRESSURE: 157 MMHG | DIASTOLIC BLOOD PRESSURE: 94 MMHG

## 2018-07-30 VITALS — DIASTOLIC BLOOD PRESSURE: 87 MMHG | SYSTOLIC BLOOD PRESSURE: 149 MMHG

## 2018-07-30 VITALS — SYSTOLIC BLOOD PRESSURE: 150 MMHG | DIASTOLIC BLOOD PRESSURE: 92 MMHG

## 2018-07-30 LAB
ALBUMIN SERPL-MCNC: 3.3 G/DL (ref 3.4–5)
ALP SERPL-CCNC: 66 U/L (ref 45–117)
ALT SERPL-CCNC: 26 U/L (ref 16–61)
ANION GAP SERPL CALCULATED.3IONS-SCNC: 10 MMOL/L (ref 5–15)
BILIRUB SERPL-MCNC: 0.4 MG/DL (ref 0.2–1)
BUN SERPL-MCNC: 12 MG/DL (ref 7–18)
BUN/CREAT SERPL: 13.5
CALCIUM SERPL-MCNC: 8 MG/DL (ref 8.5–10.1)
CHLORIDE SERPL-SCNC: 104 MMOL/L (ref 98–107)
CO2 SERPL-SCNC: 24 MMOL/L (ref 21–32)
GLUCOSE SERPL-MCNC: 101 MG/DL (ref 74–106)
POTASSIUM SERPL-SCNC: 2.7 MMOL/L (ref 3.5–5.1)
PROT SERPL-MCNC: 6.7 G/DL (ref 6.4–8.2)
SODIUM SERPL-SCNC: 138 MMOL/L (ref 136–145)

## 2018-07-30 RX ADMIN — SODIUM CHLORIDE SCH MLS/HR: 0.9 INJECTION, SOLUTION INTRAVENOUS at 20:53

## 2018-07-30 RX ADMIN — MORPHINE SULFATE PRN MG: 2 INJECTION, SOLUTION INTRAMUSCULAR; INTRAVENOUS at 18:59

## 2018-07-30 RX ADMIN — SODIUM CHLORIDE SCH MLS/HR: 0.9 INJECTION, SOLUTION INTRAVENOUS at 11:03

## 2018-07-30 RX ADMIN — SODIUM CHLORIDE SCH MLS/HR: 0.9 INJECTION, SOLUTION INTRAVENOUS at 00:53

## 2018-07-30 RX ADMIN — CEFTRIAXONE SODIUM SCH MLS/HR: 1 INJECTION, POWDER, FOR SOLUTION INTRAMUSCULAR; INTRAVENOUS at 09:56

## 2018-07-30 RX ADMIN — SODIUM CHLORIDE SCH MG: 9 INJECTION, SOLUTION INTRAVENOUS at 22:49

## 2018-07-30 RX ADMIN — LORAZEPAM PRN MG: 2 INJECTION, SOLUTION INTRAMUSCULAR; INTRAVENOUS at 14:32

## 2018-07-30 RX ADMIN — SODIUM CHLORIDE SCH MG: 9 INJECTION, SOLUTION INTRAVENOUS at 09:56

## 2018-07-30 RX ADMIN — ENOXAPARIN SODIUM SCH MG: 40 INJECTION SUBCUTANEOUS at 09:58

## 2018-07-31 VITALS — SYSTOLIC BLOOD PRESSURE: 138 MMHG | DIASTOLIC BLOOD PRESSURE: 74 MMHG

## 2018-07-31 VITALS — SYSTOLIC BLOOD PRESSURE: 135 MMHG | DIASTOLIC BLOOD PRESSURE: 79 MMHG

## 2018-07-31 VITALS — SYSTOLIC BLOOD PRESSURE: 130 MMHG | DIASTOLIC BLOOD PRESSURE: 83 MMHG

## 2018-07-31 VITALS — SYSTOLIC BLOOD PRESSURE: 134 MMHG | DIASTOLIC BLOOD PRESSURE: 76 MMHG

## 2018-07-31 VITALS — SYSTOLIC BLOOD PRESSURE: 144 MMHG | DIASTOLIC BLOOD PRESSURE: 88 MMHG

## 2018-07-31 LAB
ANION GAP SERPL CALCULATED.3IONS-SCNC: 7 MMOL/L (ref 5–15)
BUN SERPL-MCNC: 11 MG/DL (ref 7–18)
BUN/CREAT SERPL: 13.3
CALCIUM SERPL-MCNC: 8.1 MG/DL (ref 8.5–10.1)
CHLORIDE SERPL-SCNC: 104 MMOL/L (ref 98–107)
CO2 SERPL-SCNC: 27 MMOL/L (ref 21–32)
GLUCOSE SERPL-MCNC: 84 MG/DL (ref 74–106)
HCT VFR BLD AUTO: 37.3 % (ref 41–53)
HGB BLD-MCNC: 13 G/DL (ref 13.5–17.5)
MCH RBC QN AUTO: 33.9 PG (ref 28–32)
MCV RBC AUTO: 96.7 FL (ref 80–100)
NRBC BLD QL AUTO: 0 %
POTASSIUM SERPL-SCNC: 3.2 MMOL/L (ref 3.5–5.1)
SODIUM SERPL-SCNC: 138 MMOL/L (ref 136–145)

## 2018-07-31 RX ADMIN — POTASSIUM CHLORIDE SCH MLS/HR: 200 INJECTION, SOLUTION INTRAVENOUS at 19:26

## 2018-07-31 RX ADMIN — SODIUM CHLORIDE SCH MLS/HR: 0.9 INJECTION, SOLUTION INTRAVENOUS at 17:21

## 2018-07-31 RX ADMIN — LORAZEPAM PRN MG: 2 INJECTION, SOLUTION INTRAMUSCULAR; INTRAVENOUS at 00:27

## 2018-07-31 RX ADMIN — CEFTRIAXONE SODIUM SCH MLS/HR: 1 INJECTION, POWDER, FOR SOLUTION INTRAMUSCULAR; INTRAVENOUS at 08:46

## 2018-07-31 RX ADMIN — SODIUM CHLORIDE SCH MLS/HR: 0.9 INJECTION, SOLUTION INTRAVENOUS at 06:54

## 2018-07-31 RX ADMIN — SODIUM CHLORIDE SCH MG: 9 INJECTION, SOLUTION INTRAVENOUS at 08:46

## 2018-07-31 RX ADMIN — LORAZEPAM PRN MG: 2 INJECTION, SOLUTION INTRAMUSCULAR; INTRAVENOUS at 08:07

## 2018-07-31 RX ADMIN — SODIUM CHLORIDE SCH MG: 9 INJECTION, SOLUTION INTRAVENOUS at 21:22

## 2018-07-31 RX ADMIN — LORAZEPAM PRN MG: 2 INJECTION, SOLUTION INTRAMUSCULAR; INTRAVENOUS at 17:22

## 2018-07-31 RX ADMIN — POTASSIUM CHLORIDE SCH MLS/HR: 200 INJECTION, SOLUTION INTRAVENOUS at 14:25

## 2018-07-31 RX ADMIN — POTASSIUM CHLORIDE SCH MLS/HR: 200 INJECTION, SOLUTION INTRAVENOUS at 17:06

## 2018-07-31 RX ADMIN — MORPHINE SULFATE PRN MG: 2 INJECTION, SOLUTION INTRAMUSCULAR; INTRAVENOUS at 14:25

## 2018-07-31 RX ADMIN — ENOXAPARIN SODIUM SCH MG: 40 INJECTION SUBCUTANEOUS at 09:43

## 2018-07-31 RX ADMIN — MORPHINE SULFATE PRN MG: 2 INJECTION, SOLUTION INTRAMUSCULAR; INTRAVENOUS at 10:01

## 2018-07-31 RX ADMIN — PANTOPRAZOLE SODIUM SCH MG: 40 TABLET, DELAYED RELEASE ORAL at 21:22

## 2018-08-01 VITALS — DIASTOLIC BLOOD PRESSURE: 85 MMHG | SYSTOLIC BLOOD PRESSURE: 135 MMHG

## 2018-08-01 VITALS — DIASTOLIC BLOOD PRESSURE: 85 MMHG | SYSTOLIC BLOOD PRESSURE: 138 MMHG

## 2018-08-01 VITALS — SYSTOLIC BLOOD PRESSURE: 145 MMHG | DIASTOLIC BLOOD PRESSURE: 87 MMHG

## 2018-08-01 VITALS — DIASTOLIC BLOOD PRESSURE: 89 MMHG | SYSTOLIC BLOOD PRESSURE: 154 MMHG

## 2018-08-01 LAB
ANION GAP SERPL CALCULATED.3IONS-SCNC: 8 MMOL/L (ref 5–15)
APTT PPP: 26 SEC (ref 23.78–33.04)
BUN SERPL-MCNC: 7 MG/DL (ref 7–18)
BUN/CREAT SERPL: 8.3
CALCIUM SERPL-MCNC: 8.3 MG/DL (ref 8.5–10.1)
CHLORIDE SERPL-SCNC: 107 MMOL/L (ref 98–107)
CO2 SERPL-SCNC: 26 MMOL/L (ref 21–32)
GLUCOSE SERPL-MCNC: 79 MG/DL (ref 74–106)
HCT VFR BLD AUTO: 38.5 % (ref 41–53)
HGB BLD-MCNC: 13.5 G/DL (ref 13.5–17.5)
INR PPP: 0.99 (ref 0.9–1.15)
MCH RBC QN AUTO: 34 PG (ref 28–32)
MCV RBC AUTO: 97.1 FL (ref 80–100)
NRBC BLD QL AUTO: 0.1 %
POTASSIUM SERPL-SCNC: 3.9 MMOL/L (ref 3.5–5.1)
PROTHROMBIN TIME: 10.6 SEC (ref 9.27–12.13)
SODIUM SERPL-SCNC: 141 MMOL/L (ref 136–145)

## 2018-08-01 PROCEDURE — 0DB68ZX EXCISION OF STOMACH, VIA NATURAL OR ARTIFICIAL OPENING ENDOSCOPIC, DIAGNOSTIC: ICD-10-PCS | Performed by: INTERNAL MEDICINE

## 2018-08-01 RX ADMIN — MIDAZOLAM HYDROCHLORIDE ONE MG: 5 INJECTION INTRAMUSCULAR; INTRAVENOUS at 11:16

## 2018-08-01 RX ADMIN — CEFTRIAXONE SODIUM SCH MLS/HR: 1 INJECTION, POWDER, FOR SOLUTION INTRAMUSCULAR; INTRAVENOUS at 09:21

## 2018-08-01 RX ADMIN — SODIUM CHLORIDE SCH MG: 9 INJECTION, SOLUTION INTRAVENOUS at 09:21

## 2018-08-01 RX ADMIN — SODIUM CHLORIDE SCH MG: 9 INJECTION, SOLUTION INTRAVENOUS at 21:30

## 2018-08-01 RX ADMIN — SODIUM CHLORIDE SCH MLS/HR: 0.9 INJECTION, SOLUTION INTRAVENOUS at 03:12

## 2018-08-01 RX ADMIN — PANTOPRAZOLE SODIUM SCH MG: 40 TABLET, DELAYED RELEASE ORAL at 09:21

## 2018-08-01 RX ADMIN — FENTANYL CITRATE ONE MCG: 50 INJECTION, SOLUTION INTRAMUSCULAR; INTRAVENOUS at 11:16

## 2018-08-01 RX ADMIN — ENOXAPARIN SODIUM SCH MG: 40 INJECTION SUBCUTANEOUS at 09:31

## 2018-08-01 RX ADMIN — MORPHINE SULFATE PRN MG: 2 INJECTION, SOLUTION INTRAMUSCULAR; INTRAVENOUS at 21:30

## 2018-08-01 RX ADMIN — POTASSIUM CHLORIDE SCH MLS/HR: 200 INJECTION, SOLUTION INTRAVENOUS at 00:18

## 2018-08-01 RX ADMIN — FENTANYL CITRATE ONE MCG: 50 INJECTION, SOLUTION INTRAMUSCULAR; INTRAVENOUS at 11:19

## 2018-08-01 RX ADMIN — SODIUM CHLORIDE SCH MLS/HR: 0.9 INJECTION, SOLUTION INTRAVENOUS at 21:24

## 2018-08-01 RX ADMIN — POTASSIUM CHLORIDE SCH MLS/HR: 200 INJECTION, SOLUTION INTRAVENOUS at 03:15

## 2018-08-01 RX ADMIN — MORPHINE SULFATE PRN MG: 2 INJECTION, SOLUTION INTRAMUSCULAR; INTRAVENOUS at 16:10

## 2018-08-01 RX ADMIN — MIDAZOLAM HYDROCHLORIDE ONE MG: 5 INJECTION INTRAMUSCULAR; INTRAVENOUS at 11:19

## 2018-08-02 VITALS — SYSTOLIC BLOOD PRESSURE: 120 MMHG | DIASTOLIC BLOOD PRESSURE: 79 MMHG

## 2018-08-02 VITALS — SYSTOLIC BLOOD PRESSURE: 140 MMHG | DIASTOLIC BLOOD PRESSURE: 83 MMHG

## 2018-08-02 VITALS — DIASTOLIC BLOOD PRESSURE: 83 MMHG | SYSTOLIC BLOOD PRESSURE: 129 MMHG

## 2018-08-02 RX ADMIN — SODIUM CHLORIDE SCH MLS/HR: 0.9 INJECTION, SOLUTION INTRAVENOUS at 04:06

## 2018-08-02 RX ADMIN — CEFTRIAXONE SODIUM SCH MLS/HR: 1 INJECTION, POWDER, FOR SOLUTION INTRAMUSCULAR; INTRAVENOUS at 10:33

## 2018-08-02 RX ADMIN — ENOXAPARIN SODIUM SCH MG: 40 INJECTION SUBCUTANEOUS at 10:34

## 2018-08-02 RX ADMIN — SODIUM CHLORIDE SCH MG: 9 INJECTION, SOLUTION INTRAVENOUS at 10:33

## 2022-03-28 NOTE — TELEPHONE ENCOUNTER
----- Message from Merlyn Haynes sent at 7/24/2017  8:01 AM CDT -----  Contact: self  Patient needs refills on pain meds   You can access the FollowMyHealth Patient Portal offered by [facility] by registering at the following website: [email or web address]. By joining [employer]’s FollowMyHealth portal, you will also be able to view your health information using other applications (apps) compatible with our system.

## (undated) DEVICE — CUTTER MENISCUS AGGRESSIVE 4.0

## (undated) DEVICE — SLEEVE LATERAL TRACTION ARM

## (undated) DEVICE — SPONGE GAUZE 16PLY 4X4

## (undated) DEVICE — CUTTER AGGRESSIVE PLUS 3.5MM

## (undated) DEVICE — NDL BOX COUNTER

## (undated) DEVICE — NDL HYPODERMIC BLUNT 18G 1.5IN

## (undated) DEVICE — SEE MEDLINE ITEM 152487

## (undated) DEVICE — SEE MEDLINE ITEM 157131

## (undated) DEVICE — PACK SHOULDER

## (undated) DEVICE — SKINMARKER W/RULER DEVON

## (undated) DEVICE — CANISTER SUCTION MEDI-VAC 12L

## (undated) DEVICE — PAD ABD 8X10 STERILE

## (undated) DEVICE — SLEEVE SCD EXPRESS CALF MEDIUM

## (undated) DEVICE — BLADE SURG CARBON STEEL SZ11

## (undated) DEVICE — SEE MEDLINE ITEM 146270

## (undated) DEVICE — SOL IRR NACL .9% 3000ML

## (undated) DEVICE — GOWN B1 X-LG X-LONG

## (undated) DEVICE — SEE MEDLINE ITEM 146313

## (undated) DEVICE — GLOVE SURG ULTRA TOUCH 8

## (undated) DEVICE — SEE MEDLINE ITEM 157166

## (undated) DEVICE — APPLICATOR CHLORAPREP ORN 26ML

## (undated) DEVICE — UNDERGLOVES BIOGEL PI SIZE 7.5

## (undated) DEVICE — SEE MEDLINE ITEM 157116

## (undated) DEVICE — SUT ETHILON 3-0 FS-1 30

## (undated) DEVICE — DRAPE STERI INSTRUMENT 1018

## (undated) DEVICE — ELECTRODE REM PLYHSV RETURN 9

## (undated) DEVICE — SEE MEDLINE ITEM 146420

## (undated) DEVICE — DRESSING N ADH OIL EMUL 3X3

## (undated) DEVICE — STRAP OR TABLE 5IN X 72IN

## (undated) DEVICE — WRAP SELF ADH. COBAN 4X5YD

## (undated) DEVICE — PROBE ABLATION RF ARTHSCP 3.5

## (undated) DEVICE — SEE MEDLINE ITEM 152622

## (undated) DEVICE — GLOVE SURG ULTRA TOUCH 7.5

## (undated) DEVICE — DRAPE STERI U-SHAPED 47X51IN

## (undated) DEVICE — LINER SUCTION 3000CC

## (undated) DEVICE — SYR 3CC LUER LOC

## (undated) DEVICE — SOL 9P NACL IRR PIC IL

## (undated) DEVICE — PACK CUSTOM UNIV BASIN SLI

## (undated) DEVICE — DRAPE INCISE IOBAN 2 23X17IN

## (undated) DEVICE — COVER SURG LIGHT HANDLE

## (undated) DEVICE — NDL SPINAL 18GX3.5 SPINOCAN

## (undated) DEVICE — GAUZE SPONGE BULKEE 6X6.75IN

## (undated) DEVICE — Device

## (undated) DEVICE — CONTAINER SPECIMEN STRL 4OZ